# Patient Record
Sex: FEMALE | Race: BLACK OR AFRICAN AMERICAN | NOT HISPANIC OR LATINO
[De-identification: names, ages, dates, MRNs, and addresses within clinical notes are randomized per-mention and may not be internally consistent; named-entity substitution may affect disease eponyms.]

---

## 2020-07-23 PROBLEM — Z00.00 ENCOUNTER FOR PREVENTIVE HEALTH EXAMINATION: Status: ACTIVE | Noted: 2020-07-23

## 2020-08-03 ENCOUNTER — LABORATORY RESULT (OUTPATIENT)
Age: 45
End: 2020-08-03

## 2020-08-06 ENCOUNTER — APPOINTMENT (OUTPATIENT)
Dept: PULMONOLOGY | Facility: CLINIC | Age: 45
End: 2020-08-06
Payer: COMMERCIAL

## 2020-08-06 VITALS
TEMPERATURE: 98.2 F | OXYGEN SATURATION: 97 % | SYSTOLIC BLOOD PRESSURE: 100 MMHG | HEART RATE: 83 BPM | HEIGHT: 65 IN | BODY MASS INDEX: 27.99 KG/M2 | WEIGHT: 168 LBS | DIASTOLIC BLOOD PRESSURE: 83 MMHG

## 2020-08-06 PROCEDURE — 99244 OFF/OP CNSLTJ NEW/EST MOD 40: CPT | Mod: 25

## 2020-08-06 PROCEDURE — 94010 BREATHING CAPACITY TEST: CPT

## 2020-08-06 NOTE — PHYSICAL EXAM
[No Acute Distress] : no acute distress [Normal Appearance] : normal appearance [Normal S1, S2] : normal s1, s2 [Normal Rate/Rhythm] : normal rate/rhythm [No Resp Distress] : no resp distress [No Murmurs] : no murmurs [Clear to Auscultation Bilaterally] : clear to auscultation bilaterally [Benign] : benign [No Clubbing] : no clubbing [No Edema] : no edema [Normal Color/ Pigmentation] : normal color/ pigmentation [Normal Affect] : normal affect

## 2020-08-11 ENCOUNTER — LABORATORY RESULT (OUTPATIENT)
Age: 45
End: 2020-08-11

## 2020-08-13 ENCOUNTER — OUTPATIENT (OUTPATIENT)
Dept: OUTPATIENT SERVICES | Facility: HOSPITAL | Age: 45
LOS: 1 days | End: 2020-08-13
Payer: COMMERCIAL

## 2020-08-13 DIAGNOSIS — R06.00 DYSPNEA, UNSPECIFIED: ICD-10-CM

## 2020-08-13 PROCEDURE — 94010 BREATHING CAPACITY TEST: CPT | Mod: 26

## 2020-08-13 PROCEDURE — 94726 PLETHYSMOGRAPHY LUNG VOLUMES: CPT

## 2020-08-13 PROCEDURE — 94760 N-INVAS EAR/PLS OXIMETRY 1: CPT

## 2020-08-13 PROCEDURE — 94726 PLETHYSMOGRAPHY LUNG VOLUMES: CPT | Mod: 26

## 2020-08-13 PROCEDURE — 94729 DIFFUSING CAPACITY: CPT

## 2020-08-13 PROCEDURE — 94060 EVALUATION OF WHEEZING: CPT

## 2020-08-13 PROCEDURE — 95012 NITRIC OXIDE EXP GAS DETER: CPT

## 2020-08-13 PROCEDURE — 94729 DIFFUSING CAPACITY: CPT | Mod: 26

## 2020-08-21 ENCOUNTER — TRANSCRIPTION ENCOUNTER (OUTPATIENT)
Age: 45
End: 2020-08-21

## 2020-08-21 NOTE — ASSESSMENT
[FreeTextEntry1] : Data reviewed:\par \par CXR reportedly clear x 2 and not repeated today\par \par Luz 08/06/2020 : normal\par \par Impression:\par Chest tightness\par \par Plan:\par I doubt she has a lung disease to explain this symptom - asthma would be the likeliest pulmonary disorder and she's had no improvement on Symbicort and normal spirometry today despite having chest tightness at this time. Will hold Symbicort and do full PFT and FENO.\par --\par PFT [facility] 8/2020: normal luz, no BD response, normal volumes, DLCO 71% / FENO 14\par Reviewed w her 8/21.

## 2020-08-21 NOTE — HISTORY OF PRESENT ILLNESS
[TextBox_4] : 08/06/2020: Asked to evaluate patient by Dr Hugo King for dyspnea. Works in [employer], on March 12 was exposed to Covid and on March 17 started to have chest and back pain. This continued for about 5 weeks. No fever or other symptoms during that time. Then developed some sinus congestion. [place] [facility] Nasal stuffiness persisted and chest got tight and she went to [facility] ED about a month ago. 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 Put on Prevacid for reflux and Allegra, but symptoms worsened and she went back to [facility] ED. Covid Ab, CXR, bloods all negative. No cough or wheeze. Worse in heat. Gives her such a sense of pressure she has to urinate and have a bowel movement. Has never had asthma. Doesn't smoke. working from home during Covid, has a dog. Has lost weight during this illness, a little. Has had anxiety, went to ED for panic May 2019. The ENT also tried montelukast but she only tolerated x 2 days due to diarrhea. And an NP started Symbicort 2 weeks ago and she takes 2 puffs bid. This has made only a little difference. Last week has used a rescue inhaler x 2.\par

## 2020-08-21 NOTE — CONSULT LETTER
[Dear  ___] : Dear  [unfilled], [Please see my note below.] : Please see my note below. [( Thank you for referring [unfilled] for consultation for _____ )] : Thank you for referring [unfilled] for consultation for [unfilled] [Consult Closing:] : Thank you very much for allowing me to participate in the care of this patient.  If you have any questions, please do not hesitate to contact me. [Sincerely,] : Sincerely, [FreeTextEntry2] : Hugo King MD\par [address]\[place] [address] [FreeTextEntry3] : Tea Barajas MD, FCCP\par

## 2022-07-14 ENCOUNTER — TRANSCRIPTION ENCOUNTER (OUTPATIENT)
Age: 47
End: 2022-07-14

## 2022-07-15 ENCOUNTER — INPATIENT (INPATIENT)
Facility: HOSPITAL | Age: 47
LOS: 3 days | Discharge: HOME HEALTH | DRG: 494 | End: 2022-07-19
Attending: ORTHOPAEDIC SURGERY | Admitting: ORTHOPAEDIC SURGERY
Payer: COMMERCIAL

## 2022-07-15 VITALS
OXYGEN SATURATION: 100 % | HEIGHT: 65 IN | SYSTOLIC BLOOD PRESSURE: 122 MMHG | WEIGHT: 184.97 LBS | RESPIRATION RATE: 16 BRPM | TEMPERATURE: 98 F | DIASTOLIC BLOOD PRESSURE: 71 MMHG | HEART RATE: 75 BPM

## 2022-07-15 LAB
ANION GAP SERPL CALC-SCNC: 14 MMOL/L — SIGNIFICANT CHANGE UP (ref 5–17)
APTT BLD: 30.4 SEC — SIGNIFICANT CHANGE UP (ref 27.5–35.5)
BASOPHILS # BLD AUTO: 0.01 K/UL — SIGNIFICANT CHANGE UP (ref 0–0.2)
BASOPHILS NFR BLD AUTO: 0.2 % — SIGNIFICANT CHANGE UP (ref 0–2)
BLD GP AB SCN SERPL QL: NEGATIVE — SIGNIFICANT CHANGE UP
BUN SERPL-MCNC: 15 MG/DL — SIGNIFICANT CHANGE UP (ref 7–23)
CALCIUM SERPL-MCNC: 9.2 MG/DL — SIGNIFICANT CHANGE UP (ref 8.4–10.5)
CHLORIDE SERPL-SCNC: 109 MMOL/L — HIGH (ref 96–108)
CO2 SERPL-SCNC: 20 MMOL/L — LOW (ref 22–31)
CREAT SERPL-MCNC: 0.87 MG/DL — SIGNIFICANT CHANGE UP (ref 0.5–1.3)
EGFR: 82 ML/MIN/1.73M2 — SIGNIFICANT CHANGE UP
EOSINOPHIL # BLD AUTO: 0.09 K/UL — SIGNIFICANT CHANGE UP (ref 0–0.5)
EOSINOPHIL NFR BLD AUTO: 1.4 % — SIGNIFICANT CHANGE UP (ref 0–6)
GLUCOSE SERPL-MCNC: 94 MG/DL — SIGNIFICANT CHANGE UP (ref 70–99)
HCG SERPL-ACNC: <0 MIU/ML — SIGNIFICANT CHANGE UP
HCT VFR BLD CALC: 35.1 % — SIGNIFICANT CHANGE UP (ref 34.5–45)
HGB BLD-MCNC: 11.3 G/DL — LOW (ref 11.5–15.5)
IMM GRANULOCYTES NFR BLD AUTO: 0.3 % — SIGNIFICANT CHANGE UP (ref 0–1.5)
INR BLD: 1.01 — SIGNIFICANT CHANGE UP (ref 0.88–1.16)
LYMPHOCYTES # BLD AUTO: 2.39 K/UL — SIGNIFICANT CHANGE UP (ref 1–3.3)
LYMPHOCYTES # BLD AUTO: 36.8 % — SIGNIFICANT CHANGE UP (ref 13–44)
MCHC RBC-ENTMCNC: 28.6 PG — SIGNIFICANT CHANGE UP (ref 27–34)
MCHC RBC-ENTMCNC: 32.2 GM/DL — SIGNIFICANT CHANGE UP (ref 32–36)
MCV RBC AUTO: 88.9 FL — SIGNIFICANT CHANGE UP (ref 80–100)
MONOCYTES # BLD AUTO: 0.59 K/UL — SIGNIFICANT CHANGE UP (ref 0–0.9)
MONOCYTES NFR BLD AUTO: 9.1 % — SIGNIFICANT CHANGE UP (ref 2–14)
NEUTROPHILS # BLD AUTO: 3.4 K/UL — SIGNIFICANT CHANGE UP (ref 1.8–7.4)
NEUTROPHILS NFR BLD AUTO: 52.2 % — SIGNIFICANT CHANGE UP (ref 43–77)
NRBC # BLD: 0 /100 WBCS — SIGNIFICANT CHANGE UP (ref 0–0)
PLATELET # BLD AUTO: 200 K/UL — SIGNIFICANT CHANGE UP (ref 150–400)
POTASSIUM SERPL-MCNC: 4 MMOL/L — SIGNIFICANT CHANGE UP (ref 3.5–5.3)
POTASSIUM SERPL-SCNC: 4 MMOL/L — SIGNIFICANT CHANGE UP (ref 3.5–5.3)
PROTHROM AB SERPL-ACNC: 12 SEC — SIGNIFICANT CHANGE UP (ref 10.5–13.4)
RBC # BLD: 3.95 M/UL — SIGNIFICANT CHANGE UP (ref 3.8–5.2)
RBC # FLD: 13.7 % — SIGNIFICANT CHANGE UP (ref 10.3–14.5)
RH IG SCN BLD-IMP: POSITIVE — SIGNIFICANT CHANGE UP
SARS-COV-2 RNA SPEC QL NAA+PROBE: NEGATIVE — SIGNIFICANT CHANGE UP
SODIUM SERPL-SCNC: 143 MMOL/L — SIGNIFICANT CHANGE UP (ref 135–145)
WBC # BLD: 6.5 K/UL — SIGNIFICANT CHANGE UP (ref 3.8–10.5)
WBC # FLD AUTO: 6.5 K/UL — SIGNIFICANT CHANGE UP (ref 3.8–10.5)

## 2022-07-15 PROCEDURE — 99221 1ST HOSP IP/OBS SF/LOW 40: CPT

## 2022-07-15 PROCEDURE — 73564 X-RAY EXAM KNEE 4 OR MORE: CPT | Mod: 26,LT

## 2022-07-15 PROCEDURE — 73610 X-RAY EXAM OF ANKLE: CPT | Mod: 26,LT,76

## 2022-07-15 PROCEDURE — 99285 EMERGENCY DEPT VISIT HI MDM: CPT

## 2022-07-15 PROCEDURE — 73700 CT LOWER EXTREMITY W/O DYE: CPT | Mod: 26,LT,MG

## 2022-07-15 PROCEDURE — 73590 X-RAY EXAM OF LOWER LEG: CPT | Mod: 26,LT

## 2022-07-15 PROCEDURE — G1004: CPT

## 2022-07-15 PROCEDURE — 93010 ELECTROCARDIOGRAM REPORT: CPT

## 2022-07-15 PROCEDURE — 71046 X-RAY EXAM CHEST 2 VIEWS: CPT | Mod: 26

## 2022-07-15 RX ORDER — HYDROMORPHONE HYDROCHLORIDE 2 MG/ML
0.5 INJECTION INTRAMUSCULAR; INTRAVENOUS; SUBCUTANEOUS ONCE
Refills: 0 | Status: DISCONTINUED | OUTPATIENT
Start: 2022-07-15 | End: 2022-07-15

## 2022-07-15 RX ORDER — MORPHINE SULFATE 50 MG/1
4 CAPSULE, EXTENDED RELEASE ORAL ONCE
Refills: 0 | Status: DISCONTINUED | OUTPATIENT
Start: 2022-07-15 | End: 2022-07-15

## 2022-07-15 RX ADMIN — HYDROMORPHONE HYDROCHLORIDE 0.5 MILLIGRAM(S): 2 INJECTION INTRAMUSCULAR; INTRAVENOUS; SUBCUTANEOUS at 18:13

## 2022-07-15 RX ADMIN — MORPHINE SULFATE 4 MILLIGRAM(S): 50 CAPSULE, EXTENDED RELEASE ORAL at 17:10

## 2022-07-15 NOTE — ED PROVIDER NOTE - PHYSICAL EXAMINATION
Vitals reviewed  Gen: + acute painful distress, speaking in full sentences  Skin: wwp, no rash/lesions, no skin breaks  HEENT: ncat, eomi, mmm  CV: rrr, no audible m/r/g  Resp: symmetrical expansion, ctab, no w/r/r  LLE: + deformity w/ tenting skin medial malleolus, able to move toes/knee, cap refill < 2 sec, 2+ DP/PT pulses, no calf ttp/edema, compartments soft   Neuro: alert/oriented, no focal deficits

## 2022-07-15 NOTE — H&P ADULT - HISTORY OF PRESENT ILLNESS
Orthopaedic Surgery Consult Note    Attending Physician: Ronit  Consult requested by: ED    CC: L ankle pain    HPI:  46 F denies pmh p/w L ankle pain s/p injury.  Pt [place] e[place] but denies head trauma or loc.  reports ankle was popped back into place by medical staff in [facility] then splinted by EMS.  did not take anything for pain.  denies dizziness, fainting, neck/back pain, numbness/weakness, paresthesia, skin breaks, other injuries, use of AC.

## 2022-07-15 NOTE — H&P ADULT - NSHPLABSRESULTS_GEN_ALL_CORE
Vital Signs Last 24 Hrs  T(C): 36.5 (15 Jul 2022 16:24), Max: 36.5 (15 Jul 2022 16:24)  T(F): 97.7 (15 Jul 2022 16:24), Max: 97.7 (15 Jul 2022 16:24)  HR: 70 (15 Jul 2022 21:35) (70 - 75)  BP: 127/85 (15 Jul 2022 21:35) (122/71 - 127/85)  BP(mean): --  RR: 18 (15 Jul 2022 21:35) (16 - 18)  SpO2: 98% (15 Jul 2022 21:35) (98% - 100%)    Parameters below as of 15 Jul 2022 16:24  Patient On (Oxygen Delivery Method): room air          Labs:                        11.3   6.50  )-----------( 200      ( 15 Jul 2022 19:00 )             35.1     07-15    143  |  109<H>  |  15  ----------------------------<  94  4.0   |  20<L>  |  0.87    Ca    9.2      15 Jul 2022 19:00      PT/INR - ( 15 Jul 2022 19:00 )   PT: 12.0 sec;   INR: 1.01          PTT - ( 15 Jul 2022 19:00 )  PTT:30.4 sec    Imaging:   XRay L ankle (AP/Lateral/ Mortise): Trimalleolar fracture

## 2022-07-15 NOTE — H&P ADULT - NSHPPHYSICALEXAM_GEN_ALL_CORE
Physical Exam:  General: Pt Alert and oriented, NAD  L ankle with obvious deformity and medial skin tenting, no poke holes.  Pulses: 2+ dp, pt pulses, wwp, cap refill <3 seconds  Sensation: SILT sural/saph/sp/dp/ tibial distributions  Motor: EHL/FHL firing

## 2022-07-15 NOTE — CONSULT NOTE ADULT - ASSESSMENT
Ms. Bermudez is a 46 year old female with HLD presenting s/p fracture of the left ankle pending OR repair tomorrow AM with ortho.     #Perioperative evaluation   EKG PENDING   - pt with no active cardiac conditions, including unstable coronary syndrome, decompensated CHF, significant arrhythmias, or severe valvular disease  - RCRI score of 0, which equates to a 3.9% 30-day risk of death, MI, or cardiac arrest  - Pt pending an intermediate-risk procedure with an RCRI score of 0. Given that pt has good functional capacity with METs >=4 (can climb 2 flights of stairs that lead to his residence without any CP or SOB), pt does not need further cardiac testing prior to OR.  - Intermediate risk patient for intermediate risk procedure     #HLD  - rec c/w home statin perioperatively     #Anemia   Likely BETH 2/2 menstrual blood loss. Denies any blood in the stool.  - rec iron studies and outpatient f/u   - rec colonoscopy given age     Ms. Bermudez is a 46 year old female with HLD presenting s/p fracture of the left ankle pending OR repair tomorrow AM with ortho.     #Perioperative evaluation   EKG PENDING   - pt with no active cardiac conditions, including unstable coronary syndrome, decompensated CHF, significant arrhythmias, or severe valvular disease  - RCRI score of 0, which equates to a 3.9% 30-day risk of death, MI, or cardiac arrest  - Pt pending an intermediate-risk procedure with an RCRI score of 0. Given that pt has good functional capacity with METs >=4 (can climb 2 flights of stairs that lead to his residence without any CP or SOB), pt does not need further cardiac testing prior to OR.  - TRIVEDI score of zero   - Intermediate risk patient for intermediate risk procedure     #HLD  - rec c/w home statin perioperatively     #Anemia   Likely BETH 2/2 menstrual blood loss. Denies any blood in the stool.  - rec iron studies and outpatient f/u   - rec colonoscopy given age     Ms. Bermudez is a 46 year old female with HLD presenting s/p fracture of the left ankle pending OR repair tomorrow AM with ortho.     #Perioperative evaluation   EKG PENDING   - pt with no active cardiac conditions, including unstable coronary syndrome, decompensated CHF, significant arrhythmias, or severe valvular disease  - RCRI score of 0, which equates to a 3.9% 30-day risk of death, MI, or cardiac arrest  - Pt pending an intermediate-risk procedure with an RCRI score of 0. Given that pt has good functional capacity with METs >=4 (can climb 2 flights of stairs that lead to his residence without any CP or SOB), pt does not need further cardiac testing prior to OR.  - TRIVEDI score of zero   - Intermediate risk patient for intermediate risk procedure     #HLD  - rec c/w home statin perioperatively     #Genital herpes   - patient recently contracted genital herpes   - c/w valacyclovir 1mg daily     #Anemia   Likely BETH 2/2 menstrual blood loss. Denies any blood in the stool.  - rec iron studies and outpatient f/u   - rec colonoscopy given age     Ms. Bermudez is a 46 year old female with HLD presenting s/p fracture of the left ankle pending OR repair tomorrow AM with ortho.     #Perioperative evaluation   EKG NSR with TWI in V1-V2 (can be a normal physiologic finding) and inverted T wave in V3 less than 1mm in size.   - pt with no active cardiac conditions, including unstable coronary syndrome, decompensated CHF, significant arrhythmias, or severe valvular disease  - RCRI score of 0, which equates to a 3.9% 30-day risk of death, MI, or cardiac arrest  - Pt pending an intermediate-risk procedure with an RCRI score of 0. Given that pt has good functional capacity with METs >=4 (can climb 2 flights of stairs that lead to his residence without any CP or SOB), pt does not need further cardiac testing prior to OR.   - TRIVEDI score of zero   - Intermediate risk patient for intermediate risk procedure     #HLD  - rec c/w home statin perioperatively     #Genital herpes   - patient recently contracted genital herpes   - c/w valacyclovir 1mg daily     #Anemia   Likely BETH 2/2 menstrual blood loss. Denies any blood in the stool.  - rec iron studies and outpatient f/u   - rec colonoscopy given age

## 2022-07-15 NOTE — CONSULT NOTE ADULT - SUBJECTIVE AND OBJECTIVE BOX
KRISH BERMUDEZ  46y  Female      Patient is a 46y old  Female who presents with a chief complaint of Left ankle pain found to have fracture.     46 F denies pmh p/w L ankle pain s/p injury.  Pt reports eversion injury to L ankle while roller blading in [facility], fell but denies head trauma or loc.  reports ankle was popped back into place by medical staff in [facility] then splinted by EMS.  did not take anything for pain.  denies dizziness, fainting, neck/back pain, numbness/weakness, paresthesia, skin breaks, other injuries, use of AC. She reports that she recently contracted herpes and now takes valacyclovir. Denies any adverse reactions to anesthesia in her family. COVID vaccinated x3, has had COVID twice.         REVIEW OF SYSTEMS:  CONSTITUTIONAL: No fever, weight loss, or fatigue  EYES: No eye pain, visual disturbances, or discharge  ENMT:  No difficulty hearing, tinnitus, vertigo; No sinus or throat pain  NECK: No pain or stiffness  BREASTS: No pain, masses, or nipple discharge  RESPIRATORY: No cough, wheezing, chills or hemoptysis; No shortness of breath (though she has occasional cough which she contributes to COVID)   CARDIOVASCULAR: No chest pain, palpitations, dizziness, or leg swelling  GASTROINTESTINAL: No abdominal or epigastric pain. No nausea, vomiting, or hematemesis; No diarrhea or constipation. No melena or hematochezia.  GENITOURINARY: No dysuria, frequency, hematuria, or incontinence  NEUROLOGICAL: No headaches, memory loss, loss of strength, numbness, or tremors  SKIN: No itching, burning, rashes, or lesions   LYMPH NODES: No enlarged glands  ENDOCRINE: No heat or cold intolerance; No hair loss  MUSCULOSKELETAL: pain in left ankle, otherwise MSK exam is benign   PSYCHIATRIC: No depression, anxiety, mood swings, or difficulty sleeping  HEME/LYMPH: No easy bruising, or bleeding gums  ALLERY AND IMMUNOLOGIC: No hives or eczema    T(C): 36.5 (07-15-22 @ 16:24), Max: 36.5 (07-15-22 @ 16:24)  HR: 70 (07-15-22 @ 21:35) (70 - 75)  BP: 127/85 (07-15-22 @ 21:35) (122/71 - 127/85)  RR: 18 (07-15-22 @ 21:35) (16 - 18)  SpO2: 98% (07-15-22 @ 21:35) (98% - 100%)  Wt(kg): --Vital Signs Last 24 Hrs  T(C): 36.5 (15 Jul 2022 16:24), Max: 36.5 (15 Jul 2022 16:24)  T(F): 97.7 (15 Jul 2022 16:24), Max: 97.7 (15 Jul 2022 16:24)  HR: 70 (15 Jul 2022 21:35) (70 - 75)  BP: 127/85 (15 Jul 2022 21:35) (122/71 - 127/85)  BP(mean): --  RR: 18 (15 Jul 2022 21:35) (16 - 18)  SpO2: 98% (15 Jul 2022 21:35) (98% - 100%)    Parameters below as of 15 Jul 2022 16:24  Patient On (Oxygen Delivery Method): room air        PHYSICAL EXAM:  GENERAL: NAD, well-groomed  HEAD:  Atraumatic, Normocephalic  EYES: EOMI, PERRLA, conjunctiva and sclera clear  ENMT: No tonsillar erythema, exudates, or enlargement; Moist mucous membranes, Good dentition, No lesions  NECK: Supple, No JVD, Normal thyroid  NERVOUS SYSTEM:  Alert & Oriented X3, Good concentration  CHEST/LUNG: Clear to percussion bilaterally; No rales, rhonchi, wheezing, or rubs  HEART: Regular rate and rhythm; 1/6 GEMINI RUSB   ABDOMEN: Soft, Nontender, Nondistended; Bowel sounds present  EXTREMITIES:  2+ Peripheral Pulses, No clubbing, cyanosis, or edema  LYMPH: No lymphadenopathy noted  SKIN: No rashes or lesions     Consultant(s) Notes Reviewed:  [x ] YES  [ ] NO  Care Discussed with Consultants/Other Providers [ x] YES  [ ] NO    LABS:  CBC   07-15-22 @ 19:00  Hematcorit 35.1  Hemoglobin 11.3  Mean Cell Hemoglobin 28.6  Platelet Count-Automated 200  RBC Count 3.95  Red Cell Distrib Width 13.7  Wbc Count 6.50      BMP  07-15-22 @ 19:00  Anion Gap. Serum 14  Blood Urea Nitrogen,Serm 15  Calcium, Total Serum 9.2  Carbon Dioxide, Serum 20  Chloride, Serum 109  Creatinine, Serum 0.87  eGFR in  --  eGFR in Non Afican American --  Gloucose, serum 94  Potassium, Serum 4.0  Sodium, Serum 143                  CMP  07-15-22 @ 19:00  Swapna Aminotransferase(ALT/SGPT)--  Albumin, Serum --  Alkaline Phosphatase, Serum --  Anion Gap, Serum 14  Aspartate Aminotransferase (AST/SGOT)--  Bilirubin Total, Serum --  Blood Urea Nitrogen, Serum 15  Calcium,Total Serum 9.2  Carbon Dioxide, Serum 20  Chloride, Serum 109  Creatinine, Serum 0.87  eGFR if  --  eGFR if Non African American --  Glucose, Serum 94  Potassium, Serum 4.0  Protein Total, Serum --  Sodium, Serum 143                          PT/INR  PT/INR  07-15-22 @ 19:00  INR 1.01  Prothrombin Time Comment --  Prothrobin Time, [id number]      Amylase/Lipase            RADIOLOGY & ADDITIONAL TESTS:    Imaging Personally Reviewed:  [ ] YES  [ ] NO

## 2022-07-15 NOTE — ED PROVIDER NOTE - CLINICAL SUMMARY MEDICAL DECISION MAKING FREE TEXT BOX
46 F denies pmh p/w L ankle pain s/p injury.  on exam pt in acute painful distress, LLE: + deformity w/ tenting skin medial malleolus, able to move toes/knee, cap refill < 2 sec, 2+ DP/PT pulses, no calf ttp/edema, compartments soft.  clinically dislocated but NVI.  will obtain xray, give morphine and c/s ortho

## 2022-07-15 NOTE — ED ADULT NURSE NOTE - OBJECTIVE STATEMENT
Pt presented to ED with c/o of possible left ankle fracture due to fall she sustained aprox 2 h ago. AOX4. VSS.  Patient denies chest pain, discomfort, shortness of breath, difficulty breathing and any form of distress not noted. Currently having menstruation. Patient oriented to ED area. All needs attended. Rounding in progress. Fall risk precautions maintained. Pt presented to ED with c/o of possible left ankle fracture/dislocation due to fall & twist she sustained aprox 2 h ago. AOX4. VSS.  Patient denies LOC changes, chest pain, discomfort, shortness of breath, difficulty breathing and any form of distress not noted. Currently having menstruation. Patient oriented to ED area. All needs attended. Rounding in progress. Fall risk precautions maintained.

## 2022-07-15 NOTE — ED PROVIDER NOTE - OBJECTIVE STATEMENT
46 F denies pmh p/w L ankle pain s/p injury.  Pt reports eversion injury to L ankle while roller blading in [facility], fell but denies head trauma or loc.  reports ankle was popped back into place by medical staff in [facility] then splinted by EMS.  did not take anything for pain.  denies dizziness, fainting, neck/back pain, numbness/weakness, paresthesia, skin breaks, other injuries, use of AC.

## 2022-07-15 NOTE — ED ADULT TRIAGE NOTE - OTHER COMPLAINTS
left ankle pain s/p twisting ankle roller blading. per ems no obvious deformity, [place].

## 2022-07-15 NOTE — H&P ADULT - ASSESSMENT
A/P: 46yFemale with trimalleolar fracture, pending OR for L ankle ORIF 7/16  - admit  - pain control  - preop labs, CXR, EKG  - 2U on hold for OR  - NPO after midnight  - WBS: NWB LLE in AO splint, strict elevation  - Dispo: pending OR  - Discussed with Attending, Dr. Ronit Loco, PGY-2  Ortho Pager [phone]

## 2022-07-16 ENCOUNTER — TRANSCRIPTION ENCOUNTER (OUTPATIENT)
Age: 47
End: 2022-07-16

## 2022-07-16 DIAGNOSIS — S82.899A OTHER FRACTURE OF UNSPECIFIED LOWER LEG, INITIAL ENCOUNTER FOR CLOSED FRACTURE: ICD-10-CM

## 2022-07-16 LAB
ANION GAP SERPL CALC-SCNC: 11 MMOL/L — SIGNIFICANT CHANGE UP (ref 5–17)
BUN SERPL-MCNC: 11 MG/DL — SIGNIFICANT CHANGE UP (ref 7–23)
CALCIUM SERPL-MCNC: 9 MG/DL — SIGNIFICANT CHANGE UP (ref 8.4–10.5)
CHLORIDE SERPL-SCNC: 107 MMOL/L — SIGNIFICANT CHANGE UP (ref 96–108)
CO2 SERPL-SCNC: 24 MMOL/L — SIGNIFICANT CHANGE UP (ref 22–31)
CREAT SERPL-MCNC: 0.77 MG/DL — SIGNIFICANT CHANGE UP (ref 0.5–1.3)
EGFR: 95 ML/MIN/1.73M2 — SIGNIFICANT CHANGE UP
GLUCOSE SERPL-MCNC: 103 MG/DL — HIGH (ref 70–99)
HCG UR QL: NEGATIVE — SIGNIFICANT CHANGE UP
HCT VFR BLD CALC: 35.2 % — SIGNIFICANT CHANGE UP (ref 34.5–45)
HGB BLD-MCNC: 11.1 G/DL — LOW (ref 11.5–15.5)
MCHC RBC-ENTMCNC: 28.4 PG — SIGNIFICANT CHANGE UP (ref 27–34)
MCHC RBC-ENTMCNC: 31.5 GM/DL — LOW (ref 32–36)
MCV RBC AUTO: 90 FL — SIGNIFICANT CHANGE UP (ref 80–100)
NRBC # BLD: 0 /100 WBCS — SIGNIFICANT CHANGE UP (ref 0–0)
PLATELET # BLD AUTO: 195 K/UL — SIGNIFICANT CHANGE UP (ref 150–400)
POTASSIUM SERPL-MCNC: 3.9 MMOL/L — SIGNIFICANT CHANGE UP (ref 3.5–5.3)
POTASSIUM SERPL-SCNC: 3.9 MMOL/L — SIGNIFICANT CHANGE UP (ref 3.5–5.3)
RBC # BLD: 3.91 M/UL — SIGNIFICANT CHANGE UP (ref 3.8–5.2)
RBC # FLD: 13.9 % — SIGNIFICANT CHANGE UP (ref 10.3–14.5)
SODIUM SERPL-SCNC: 142 MMOL/L — SIGNIFICANT CHANGE UP (ref 135–145)
WBC # BLD: 6.67 K/UL — SIGNIFICANT CHANGE UP (ref 3.8–10.5)
WBC # FLD AUTO: 6.67 K/UL — SIGNIFICANT CHANGE UP (ref 3.8–10.5)

## 2022-07-16 DEVICE — SCREW KREULOCK VAR TI 3X12MM: Type: IMPLANTABLE DEVICE | Status: FUNCTIONAL

## 2022-07-16 DEVICE — IMPLANTABLE DEVICE: Type: IMPLANTABLE DEVICE | Status: FUNCTIONAL

## 2022-07-16 DEVICE — SCREW CORT LPS 3.5X18MM: Type: IMPLANTABLE DEVICE | Status: FUNCTIONAL

## 2022-07-16 DEVICE — SCREW CORT 3.5X16MM: Type: IMPLANTABLE DEVICE | Status: FUNCTIONAL

## 2022-07-16 DEVICE — SCREW KREULOCK VAR TI 3X16MM: Type: IMPLANTABLE DEVICE | Status: FUNCTIONAL

## 2022-07-16 DEVICE — SCREW CORT LPS 3.5X12MM: Type: IMPLANTABLE DEVICE | Status: FUNCTIONAL

## 2022-07-16 DEVICE — SCREW KREULOCK VAR TI 3X14MM: Type: IMPLANTABLE DEVICE | Status: FUNCTIONAL

## 2022-07-16 DEVICE — SCREW CORT 3.5X14MM: Type: IMPLANTABLE DEVICE | Status: FUNCTIONAL

## 2022-07-16 RX ORDER — ONDANSETRON 8 MG/1
4 TABLET, FILM COATED ORAL ONCE
Refills: 0 | Status: COMPLETED | OUTPATIENT
Start: 2022-07-16 | End: 2022-07-16

## 2022-07-16 RX ORDER — ASPIRIN/CALCIUM CARB/MAGNESIUM 324 MG
325 TABLET ORAL DAILY
Refills: 0 | Status: DISCONTINUED | OUTPATIENT
Start: 2022-07-17 | End: 2022-07-19

## 2022-07-16 RX ORDER — POVIDONE-IODINE 5 %
1 AEROSOL (ML) TOPICAL ONCE
Refills: 0 | Status: COMPLETED | OUTPATIENT
Start: 2022-07-16 | End: 2022-07-16

## 2022-07-16 RX ORDER — OXYCODONE HYDROCHLORIDE 5 MG/1
5 TABLET ORAL EVERY 4 HOURS
Refills: 0 | Status: DISCONTINUED | OUTPATIENT
Start: 2022-07-16 | End: 2022-07-18

## 2022-07-16 RX ORDER — CHLORHEXIDINE GLUCONATE 213 G/1000ML
1 SOLUTION TOPICAL EVERY 12 HOURS
Refills: 0 | Status: DISCONTINUED | OUTPATIENT
Start: 2022-07-16 | End: 2022-07-16

## 2022-07-16 RX ORDER — ASPIRIN/CALCIUM CARB/MAGNESIUM 324 MG
1 TABLET ORAL
Qty: 30 | Refills: 0
Start: 2022-07-16 | End: 2022-08-14

## 2022-07-16 RX ORDER — HYDROMORPHONE HYDROCHLORIDE 2 MG/ML
0.5 INJECTION INTRAMUSCULAR; INTRAVENOUS; SUBCUTANEOUS EVERY 4 HOURS
Refills: 0 | Status: DISCONTINUED | OUTPATIENT
Start: 2022-07-16 | End: 2022-07-19

## 2022-07-16 RX ORDER — OXYCODONE HYDROCHLORIDE 5 MG/1
10 TABLET ORAL EVERY 4 HOURS
Refills: 0 | Status: DISCONTINUED | OUTPATIENT
Start: 2022-07-16 | End: 2022-07-18

## 2022-07-16 RX ORDER — HYDROMORPHONE HYDROCHLORIDE 2 MG/ML
0.5 INJECTION INTRAMUSCULAR; INTRAVENOUS; SUBCUTANEOUS
Refills: 0 | Status: DISCONTINUED | OUTPATIENT
Start: 2022-07-16 | End: 2022-07-19

## 2022-07-16 RX ORDER — ONDANSETRON 8 MG/1
1 TABLET, FILM COATED ORAL
Qty: 0 | Refills: 0 | DISCHARGE
Start: 2022-07-16

## 2022-07-16 RX ORDER — CEFAZOLIN SODIUM 1 G
2000 VIAL (EA) INJECTION EVERY 8 HOURS
Refills: 0 | Status: COMPLETED | OUTPATIENT
Start: 2022-07-16 | End: 2022-07-17

## 2022-07-16 RX ORDER — SODIUM CHLORIDE 9 MG/ML
1000 INJECTION INTRAMUSCULAR; INTRAVENOUS; SUBCUTANEOUS
Refills: 0 | Status: DISCONTINUED | OUTPATIENT
Start: 2022-07-16 | End: 2022-07-19

## 2022-07-16 RX ORDER — ONDANSETRON 8 MG/1
4 TABLET, FILM COATED ORAL EVERY 8 HOURS
Refills: 0 | Status: DISCONTINUED | OUTPATIENT
Start: 2022-07-16 | End: 2022-07-19

## 2022-07-16 RX ADMIN — Medication 100 MILLIGRAM(S): at 17:05

## 2022-07-16 RX ADMIN — ONDANSETRON 4 MILLIGRAM(S): 8 TABLET, FILM COATED ORAL at 16:27

## 2022-07-16 RX ADMIN — ONDANSETRON 4 MILLIGRAM(S): 8 TABLET, FILM COATED ORAL at 13:31

## 2022-07-16 RX ADMIN — OXYCODONE HYDROCHLORIDE 10 MILLIGRAM(S): 5 TABLET ORAL at 23:15

## 2022-07-16 RX ADMIN — Medication 1 APPLICATION(S): at 07:09

## 2022-07-16 RX ADMIN — HYDROMORPHONE HYDROCHLORIDE 0.5 MILLIGRAM(S): 2 INJECTION INTRAMUSCULAR; INTRAVENOUS; SUBCUTANEOUS at 13:47

## 2022-07-16 NOTE — DISCHARGE NOTE PROVIDER - HOSPITAL COURSE
Admitted 7/15  L trimalleolar ankle fracture ORIF 7/16  Donna-op Antibiotics  Pain control  DVT prophylaxis  OOB/Physical Therapy Admitted 7/15  Surgery: L trimalleolar ankle fracture ORIF 7/16  Donna-op Antibiotics: Ancef  Pain control  DVT prophylaxis: ASAmg daily  OOB/Physical Therapy   Medicine consulted for co management  Inpatient events:  7/18: one episode chest pain, resolved. Troponin WNL, EKG WNL

## 2022-07-16 NOTE — PRE-OP CHECKLIST - SELECT TESTS ORDERED
CBC/CMP/COVID-19 CBC/CMP/PT/PTT/INR/Hepatic Function/Type and Cross/Type and Screen/Urinalysis/HCG/UCG/EKG/CXR/POCT Blood Glucose/COVID-19

## 2022-07-16 NOTE — PATIENT PROFILE ADULT - FUNCTIONAL ASSESSMENT - BASIC MOBILITY 6.
2-calculated by average/Not able to assess (calculate score using [facility] averaging method)

## 2022-07-16 NOTE — DISCHARGE NOTE PROVIDER - NSDCMRMEDTOKEN_GEN_ALL_CORE_FT
Adult Aspirin 325 mg oral tablet: 1 tab(s) orally once a day   oxycodone-acetaminophen 5 mg-325 mg oral tablet: 1 tab(s) orally every 4 to 6 hours MDD:4 tabs   Adult Aspirin 325 mg oral tablet: 1 tab(s) orally once a day   ondansetron 4 mg oral tablet: 1 tab(s) orally every 8 hours  oxycodone-acetaminophen 5 mg-325 mg oral tablet: 1 tab(s) orally every 4 to 6 hours MDD:4 tabs   Adult Aspirin 325 mg oral tablet: 1 tab(s) orally once a day   ondansetron 4 mg oral tablet: 1 tab(s) orally every 8 hours  traMADol 50 mg oral tablet: 0.5-1  tab(s) orally every 4-6  hours, As needed, Moderate to Severe Pain MDD:4

## 2022-07-16 NOTE — PATIENT PROFILE ADULT - FALL HARM RISK - HARM RISK INTERVENTIONS
Assistance with ambulation/Assistance OOB with selected safe patient handling equipment/Communicate Risk of Fall with Harm to all staff/Discuss with provider need for PT consult/Monitor gait and stability/Provide patient with walking aids - walker, cane, crutches/Reinforce activity limits and safety measures with patient and family/Review medications for side effects contributing to fall risk/Sit up slowly, dangle for a short time, stand at bedside before walking/Tailored Fall Risk Interventions/Toileting schedule using arm’s reach rule for commode and bathroom/Use of alarms - bed, chair and/or voice tab/Visual Cue: Yellow wristband and red socks/Bed in lowest position, wheels locked, appropriate side rails in place/Call bell, personal items and telephone in reach/Instruct patient to call for assistance before getting out of bed or chair/Non-slip footwear when patient is out of bed/[place] to call system/Physically safe environment - no spills, clutter or unnecessary equipment/Purposeful Proactive Rounding/Room/bathroom lighting operational, light cord in reach

## 2022-07-16 NOTE — DISCHARGE NOTE PROVIDER - NSDCFUADDINST_GEN_ALL_CORE_FT
Follow up with Dr. Cottrell in 1-2 weeks. Call the office at [phone] to schedule your appointment.    NEW MEDICATIONS:  Percocet 5mg-325mg - 1 tab every 4-6 hours as needed for Moderate-Severe Pain  Aspirin 325mg daily x 30 days for DVT prophylaxis    WOUND CARE: You may shower. However, you cannot get your splint wet.    ACTIVITY: Non-weightbearing to LLE, use crutches. But no heavy lifting (>10lbs) or strenuous exercise. You may resume regular diet.    Call the office if you experience increasing pain, swelling or drainage from incision sites/wounds, or temperature >101.4F.  Follow up with Dr. Cottrell in 1-2 weeks. Call the office at [phone] to schedule your appointment.    NEW MEDICATIONS:  Percocet 5mg-325mg - 1 tab every 4-6 hours as needed for Moderate-Severe Pain  Aspirin 325mg daily x 30 days for DVT prophylaxis    WOUND CARE: You may shower. However, you cannot get your splint wet.    ACTIVITY: Left leg must be non-weightbearing. You cannot put any weight whatsoever on your left leg and must use crutches. No heavy lifting (>10lbs) or strenuous exercise. You may resume regular diet.    Call the office if you experience increasing pain or temperature >101.4F.  Follow up with Dr. Cottrell in 1-2 weeks. Call the office at [phone] to schedule your appointment.    NEW MEDICATIONS:  Percocet 5mg-325mg - 1 tab every 4-6 hours as needed for Moderate-Severe Pain  Aspirin 325mg daily x 30 days for DVT prophylaxis. Take this medication as prescribed.     WOUND CARE: You may shower. However, you cannot get your splint wet.    ACTIVITY: Left leg must be non-weightbearing. You cannot put any weight whatsoever on your left leg and must use crutches. No heavy lifting (>10lbs) or strenuous exercise. You may resume regular diet.    Call the office if you experience increasing pain or temperature >101.4F, chest pain, shortness of breath, nausea/vomiting, chills, etc.

## 2022-07-16 NOTE — DISCHARGE NOTE PROVIDER - CARE PROVIDER_API CALL
Vaibhav Cottrell)  Orthopaedic Surgery Surgery  [address] [place] [address]  Phone: [phone]  Fax: [phone]  Follow Up Time:

## 2022-07-17 LAB
ANION GAP SERPL CALC-SCNC: 10 MMOL/L — SIGNIFICANT CHANGE UP (ref 5–17)
BUN SERPL-MCNC: 8 MG/DL — SIGNIFICANT CHANGE UP (ref 7–23)
CALCIUM SERPL-MCNC: 8.6 MG/DL — SIGNIFICANT CHANGE UP (ref 8.4–10.5)
CHLORIDE SERPL-SCNC: 108 MMOL/L — SIGNIFICANT CHANGE UP (ref 96–108)
CO2 SERPL-SCNC: 25 MMOL/L — SIGNIFICANT CHANGE UP (ref 22–31)
CREAT SERPL-MCNC: 0.86 MG/DL — SIGNIFICANT CHANGE UP (ref 0.5–1.3)
EGFR: 83 ML/MIN/1.73M2 — SIGNIFICANT CHANGE UP
GLUCOSE SERPL-MCNC: 110 MG/DL — HIGH (ref 70–99)
HCT VFR BLD CALC: 33.6 % — LOW (ref 34.5–45)
HGB BLD-MCNC: 10.5 G/DL — LOW (ref 11.5–15.5)
MCHC RBC-ENTMCNC: 28.3 PG — SIGNIFICANT CHANGE UP (ref 27–34)
MCHC RBC-ENTMCNC: 31.3 GM/DL — LOW (ref 32–36)
MCV RBC AUTO: 90.6 FL — SIGNIFICANT CHANGE UP (ref 80–100)
NRBC # BLD: 0 /100 WBCS — SIGNIFICANT CHANGE UP (ref 0–0)
PLATELET # BLD AUTO: 183 K/UL — SIGNIFICANT CHANGE UP (ref 150–400)
POTASSIUM SERPL-MCNC: 3.6 MMOL/L — SIGNIFICANT CHANGE UP (ref 3.5–5.3)
POTASSIUM SERPL-SCNC: 3.6 MMOL/L — SIGNIFICANT CHANGE UP (ref 3.5–5.3)
RBC # BLD: 3.71 M/UL — LOW (ref 3.8–5.2)
RBC # FLD: 14.1 % — SIGNIFICANT CHANGE UP (ref 10.3–14.5)
SODIUM SERPL-SCNC: 143 MMOL/L — SIGNIFICANT CHANGE UP (ref 135–145)
WBC # BLD: 7.21 K/UL — SIGNIFICANT CHANGE UP (ref 3.8–10.5)
WBC # FLD AUTO: 7.21 K/UL — SIGNIFICANT CHANGE UP (ref 3.8–10.5)

## 2022-07-17 PROCEDURE — 73610 X-RAY EXAM OF ANKLE: CPT | Mod: 26,LT

## 2022-07-17 PROCEDURE — 99232 SBSQ HOSP IP/OBS MODERATE 35: CPT

## 2022-07-17 RX ADMIN — OXYCODONE HYDROCHLORIDE 10 MILLIGRAM(S): 5 TABLET ORAL at 09:14

## 2022-07-17 RX ADMIN — OXYCODONE HYDROCHLORIDE 10 MILLIGRAM(S): 5 TABLET ORAL at 08:14

## 2022-07-17 RX ADMIN — Medication 325 MILLIGRAM(S): at 13:03

## 2022-07-17 RX ADMIN — OXYCODONE HYDROCHLORIDE 10 MILLIGRAM(S): 5 TABLET ORAL at 19:56

## 2022-07-17 RX ADMIN — ONDANSETRON 4 MILLIGRAM(S): 8 TABLET, FILM COATED ORAL at 13:03

## 2022-07-17 RX ADMIN — OXYCODONE HYDROCHLORIDE 10 MILLIGRAM(S): 5 TABLET ORAL at 13:23

## 2022-07-17 RX ADMIN — OXYCODONE HYDROCHLORIDE 10 MILLIGRAM(S): 5 TABLET ORAL at 00:15

## 2022-07-17 RX ADMIN — OXYCODONE HYDROCHLORIDE 10 MILLIGRAM(S): 5 TABLET ORAL at 23:27

## 2022-07-17 RX ADMIN — OXYCODONE HYDROCHLORIDE 5 MILLIGRAM(S): 5 TABLET ORAL at 04:08

## 2022-07-17 RX ADMIN — OXYCODONE HYDROCHLORIDE 5 MILLIGRAM(S): 5 TABLET ORAL at 05:08

## 2022-07-17 RX ADMIN — Medication 100 MILLIGRAM(S): at 00:28

## 2022-07-17 RX ADMIN — OXYCODONE HYDROCHLORIDE 10 MILLIGRAM(S): 5 TABLET ORAL at 12:23

## 2022-07-17 RX ADMIN — OXYCODONE HYDROCHLORIDE 10 MILLIGRAM(S): 5 TABLET ORAL at 18:56

## 2022-07-17 RX ADMIN — Medication 100 MILLIGRAM(S): at 08:15

## 2022-07-17 NOTE — PHYSICAL THERAPY INITIAL EVALUATION ADULT - ADDITIONAL COMMENTS
Pt states she lives with her 2 daughters in elevator building with 4 ANTON. pt was independent with ADls and amb and denies use of AD

## 2022-07-17 NOTE — PROGRESS NOTE ADULT - ASSESSMENT
46F w obesity (30), p/w L ankle fx s/p mechanical fall s/p ORIF w Dr. Cottrell 7/16    #Post-op state - pain controlled. PPx: ASA per ortho. On bowel regimen and incentive spirometer  #L ankle fx - mgmt per ortho  #Obesity - BMI 30 - affects all aspects of care  #Normocytic anemia - 10.5 from 11.1. Appears asymptomatic    Recommend  Overall doing well.  PT Eval today  F/U CBC in AM if still here. If for DC please f/u CBC in 4 weeks as outpatient    DISPO: Pending PT eval

## 2022-07-17 NOTE — PHYSICAL THERAPY INITIAL EVALUATION ADULT - GENERAL OBSERVATIONS, REHAB EVAL
Pt received semi supine in bed +L LE splint +hep lock + SCD. PT received consent to treat from NEGRITA Tamez. Pt is post op ORIF NWB on L LE. Pt was left as received with call bell in reach, VSS, and in NAD.

## 2022-07-17 NOTE — PHYSICAL THERAPY INITIAL EVALUATION ADULT - RANGE OF MOTION EXAMINATION, REHAB EVAL
except L LE 2/2 splint  and post op ORIF on 7/16/bilateral upper extremity ROM was WFL (within functional limits)/bilateral lower extremity ROM was WFL (within functional limits)

## 2022-07-17 NOTE — PHYSICAL THERAPY INITIAL EVALUATION ADULT - PERTINENT HX OF CURRENT PROBLEM, REHAB EVAL
46 F denies pmh p/w L ankle pain s/p injury.  Pt reports eversion injury to L ankle while roller blading in [facility], fell but denies head trauma or loc.  reports ankle was popped back into place by medical staff in [facility] then splinted by EMS.  did not take anything for pain.  denies dizziness, fainting, neck/back pain, numbness/weakness, paresthesia, skin breaks, other injuries, use of AC.

## 2022-07-18 LAB
ANION GAP SERPL CALC-SCNC: 9 MMOL/L — SIGNIFICANT CHANGE UP (ref 5–17)
BUN SERPL-MCNC: 7 MG/DL — SIGNIFICANT CHANGE UP (ref 7–23)
CALCIUM SERPL-MCNC: 8.6 MG/DL — SIGNIFICANT CHANGE UP (ref 8.4–10.5)
CHLORIDE SERPL-SCNC: 106 MMOL/L — SIGNIFICANT CHANGE UP (ref 96–108)
CK MB CFR SERPL CALC: 1.6 NG/ML — SIGNIFICANT CHANGE UP (ref 0–6.7)
CK SERPL-CCNC: 244 U/L — HIGH (ref 25–170)
CO2 SERPL-SCNC: 26 MMOL/L — SIGNIFICANT CHANGE UP (ref 22–31)
CREAT SERPL-MCNC: 0.79 MG/DL — SIGNIFICANT CHANGE UP (ref 0.5–1.3)
EGFR: 92 ML/MIN/1.73M2 — SIGNIFICANT CHANGE UP
GLUCOSE SERPL-MCNC: 155 MG/DL — HIGH (ref 70–99)
HCT VFR BLD CALC: 32.6 % — LOW (ref 34.5–45)
HGB BLD-MCNC: 10.1 G/DL — LOW (ref 11.5–15.5)
LACTATE SERPL-SCNC: 1.1 MMOL/L — SIGNIFICANT CHANGE UP (ref 0.5–2)
MCHC RBC-ENTMCNC: 28.5 PG — SIGNIFICANT CHANGE UP (ref 27–34)
MCHC RBC-ENTMCNC: 31 GM/DL — LOW (ref 32–36)
MCV RBC AUTO: 92.1 FL — SIGNIFICANT CHANGE UP (ref 80–100)
NRBC # BLD: 0 /100 WBCS — SIGNIFICANT CHANGE UP (ref 0–0)
PLATELET # BLD AUTO: 177 K/UL — SIGNIFICANT CHANGE UP (ref 150–400)
POTASSIUM SERPL-MCNC: 3.6 MMOL/L — SIGNIFICANT CHANGE UP (ref 3.5–5.3)
POTASSIUM SERPL-SCNC: 3.6 MMOL/L — SIGNIFICANT CHANGE UP (ref 3.5–5.3)
RBC # BLD: 3.54 M/UL — LOW (ref 3.8–5.2)
RBC # FLD: 14 % — SIGNIFICANT CHANGE UP (ref 10.3–14.5)
SODIUM SERPL-SCNC: 141 MMOL/L — SIGNIFICANT CHANGE UP (ref 135–145)
TROPONIN T SERPL-MCNC: 0.01 NG/ML — SIGNIFICANT CHANGE UP (ref 0–0.01)
WBC # BLD: 6.44 K/UL — SIGNIFICANT CHANGE UP (ref 3.8–10.5)
WBC # FLD AUTO: 6.44 K/UL — SIGNIFICANT CHANGE UP (ref 3.8–10.5)

## 2022-07-18 PROCEDURE — 99233 SBSQ HOSP IP/OBS HIGH 50: CPT

## 2022-07-18 RX ORDER — ONDANSETRON 8 MG/1
4 TABLET, FILM COATED ORAL ONCE
Refills: 0 | Status: COMPLETED | OUTPATIENT
Start: 2022-07-18 | End: 2022-07-18

## 2022-07-18 RX ORDER — TRAMADOL HYDROCHLORIDE 50 MG/1
50 TABLET ORAL EVERY 6 HOURS
Refills: 0 | Status: DISCONTINUED | OUTPATIENT
Start: 2022-07-18 | End: 2022-07-19

## 2022-07-18 RX ORDER — TRAMADOL HYDROCHLORIDE 50 MG/1
25 TABLET ORAL EVERY 4 HOURS
Refills: 0 | Status: DISCONTINUED | OUTPATIENT
Start: 2022-07-18 | End: 2022-07-19

## 2022-07-18 RX ORDER — PANTOPRAZOLE SODIUM 20 MG/1
20 TABLET, DELAYED RELEASE ORAL ONCE
Refills: 0 | Status: COMPLETED | OUTPATIENT
Start: 2022-07-18 | End: 2022-07-18

## 2022-07-18 RX ADMIN — PANTOPRAZOLE SODIUM 20 MILLIGRAM(S): 20 TABLET, DELAYED RELEASE ORAL at 09:16

## 2022-07-18 RX ADMIN — TRAMADOL HYDROCHLORIDE 50 MILLIGRAM(S): 50 TABLET ORAL at 12:58

## 2022-07-18 RX ADMIN — ONDANSETRON 4 MILLIGRAM(S): 8 TABLET, FILM COATED ORAL at 21:45

## 2022-07-18 RX ADMIN — Medication 325 MILLIGRAM(S): at 12:58

## 2022-07-18 RX ADMIN — TRAMADOL HYDROCHLORIDE 50 MILLIGRAM(S): 50 TABLET ORAL at 21:44

## 2022-07-18 RX ADMIN — OXYCODONE HYDROCHLORIDE 10 MILLIGRAM(S): 5 TABLET ORAL at 06:53

## 2022-07-18 RX ADMIN — OXYCODONE HYDROCHLORIDE 10 MILLIGRAM(S): 5 TABLET ORAL at 07:53

## 2022-07-18 RX ADMIN — ONDANSETRON 4 MILLIGRAM(S): 8 TABLET, FILM COATED ORAL at 10:18

## 2022-07-18 RX ADMIN — OXYCODONE HYDROCHLORIDE 10 MILLIGRAM(S): 5 TABLET ORAL at 00:27

## 2022-07-18 RX ADMIN — TRAMADOL HYDROCHLORIDE 50 MILLIGRAM(S): 50 TABLET ORAL at 22:44

## 2022-07-18 RX ADMIN — TRAMADOL HYDROCHLORIDE 50 MILLIGRAM(S): 50 TABLET ORAL at 13:58

## 2022-07-18 NOTE — PROVIDER CONTACT NOTE (CHANGE IN STATUS NOTIFICATION) - ASSESSMENT
patient complains of chest pain associated with chest heaviness, dizziness while lying in bed. VSS, denies SOB.

## 2022-07-18 NOTE — PROGRESS NOTE ADULT - ASSESSMENT
46F w obesity (30), p/w L ankle fx s/p mechanical fall s/p ORIF w Dr. Cottrell 7/16    #Post-op state - pain controlled. PPx: ASA per ortho. On bowel regimen and incentive spirometer  #Chest pain - see below  #L ankle fx - mgmt per ortho  #Obesity - BMI 30 - affects all aspects of care  #Normocytic anemia - 10.1 from 10.5 from 11.1. Appears asymptomatic    Recommend  IV Zofran now. F/U Chest discomfort. Troponin negative reassuring. Similar EKG vs pre-op. Ddx also includes reflux/GI process  Reassess this morning for improvement in chest pain.     DISPO: HPT w RW

## 2022-07-19 ENCOUNTER — TRANSCRIPTION ENCOUNTER (OUTPATIENT)
Age: 47
End: 2022-07-19

## 2022-07-19 VITALS
TEMPERATURE: 98 F | RESPIRATION RATE: 16 BRPM | OXYGEN SATURATION: 98 % | SYSTOLIC BLOOD PRESSURE: 114 MMHG | HEART RATE: 79 BPM | DIASTOLIC BLOOD PRESSURE: 71 MMHG

## 2022-07-19 PROCEDURE — C1769: CPT

## 2022-07-19 PROCEDURE — 97530 THERAPEUTIC ACTIVITIES: CPT

## 2022-07-19 PROCEDURE — 85610 PROTHROMBIN TIME: CPT

## 2022-07-19 PROCEDURE — 85027 COMPLETE CBC AUTOMATED: CPT

## 2022-07-19 PROCEDURE — 86900 BLOOD TYPING SEROLOGIC ABO: CPT

## 2022-07-19 PROCEDURE — 97162 PT EVAL MOD COMPLEX 30 MIN: CPT

## 2022-07-19 PROCEDURE — G1004: CPT

## 2022-07-19 PROCEDURE — 97116 GAIT TRAINING THERAPY: CPT

## 2022-07-19 PROCEDURE — 93005 ELECTROCARDIOGRAM TRACING: CPT

## 2022-07-19 PROCEDURE — 96374 THER/PROPH/DIAG INJ IV PUSH: CPT

## 2022-07-19 PROCEDURE — 96375 TX/PRO/DX INJ NEW DRUG ADDON: CPT

## 2022-07-19 PROCEDURE — 85025 COMPLETE CBC W/AUTO DIFF WBC: CPT

## 2022-07-19 PROCEDURE — 99232 SBSQ HOSP IP/OBS MODERATE 35: CPT

## 2022-07-19 PROCEDURE — 73700 CT LOWER EXTREMITY W/O DYE: CPT | Mod: MG

## 2022-07-19 PROCEDURE — 87635 SARS-COV-2 COVID-19 AMP PRB: CPT

## 2022-07-19 PROCEDURE — 71046 X-RAY EXAM CHEST 2 VIEWS: CPT

## 2022-07-19 PROCEDURE — 73564 X-RAY EXAM KNEE 4 OR MORE: CPT

## 2022-07-19 PROCEDURE — 81025 URINE PREGNANCY TEST: CPT

## 2022-07-19 PROCEDURE — 82553 CREATINE MB FRACTION: CPT

## 2022-07-19 PROCEDURE — 36415 COLL VENOUS BLD VENIPUNCTURE: CPT

## 2022-07-19 PROCEDURE — 84702 CHORIONIC GONADOTROPIN TEST: CPT

## 2022-07-19 PROCEDURE — 83605 ASSAY OF LACTIC ACID: CPT

## 2022-07-19 PROCEDURE — 85730 THROMBOPLASTIN TIME PARTIAL: CPT

## 2022-07-19 PROCEDURE — 73590 X-RAY EXAM OF LOWER LEG: CPT

## 2022-07-19 PROCEDURE — C1713: CPT

## 2022-07-19 PROCEDURE — 73610 X-RAY EXAM OF ANKLE: CPT

## 2022-07-19 PROCEDURE — 86850 RBC ANTIBODY SCREEN: CPT

## 2022-07-19 PROCEDURE — 99285 EMERGENCY DEPT VISIT HI MDM: CPT

## 2022-07-19 PROCEDURE — 80048 BASIC METABOLIC PNL TOTAL CA: CPT

## 2022-07-19 PROCEDURE — 82550 ASSAY OF CK (CPK): CPT

## 2022-07-19 PROCEDURE — 84484 ASSAY OF TROPONIN QUANT: CPT

## 2022-07-19 PROCEDURE — 86901 BLOOD TYPING SEROLOGIC RH(D): CPT

## 2022-07-19 RX ORDER — ONDANSETRON 8 MG/1
1 TABLET, FILM COATED ORAL
Qty: 21 | Refills: 0
Start: 2022-07-19 | End: 2022-07-25

## 2022-07-19 RX ORDER — TRAMADOL HYDROCHLORIDE 50 MG/1
0.5 TABLET ORAL
Qty: 21 | Refills: 0
Start: 2022-07-19 | End: 2022-07-25

## 2022-07-19 RX ADMIN — ONDANSETRON 4 MILLIGRAM(S): 8 TABLET, FILM COATED ORAL at 14:01

## 2022-07-19 RX ADMIN — Medication 325 MILLIGRAM(S): at 14:01

## 2022-07-19 RX ADMIN — TRAMADOL HYDROCHLORIDE 50 MILLIGRAM(S): 50 TABLET ORAL at 09:27

## 2022-07-19 NOTE — PROGRESS NOTE ADULT - ASSESSMENT
This is a 45yo woman with a PMH of obesity (BMI 30) who p/w L ankle fx s/p mechanical fall s/p ORIF w/Dr. Cottrell on 7/16.  Clinically doing well and awaiting PT clearance for discharge.     #Post-op state - pain controlled. PPx: ASA per ortho. On bowel regimen and incentive spirometer  #L ankle fx - mgmt per Ortho; PT re-eval for today   #Chest pain - resolved  #Obesity -affects all aspects of care  #Normocytic anemia - 10.1 from 10.5 from 11.1. Appears asymptomatic    DISPO: HPT w RW; medically cleared from Medicine's standpoint.    Debra Amador MD  Hospitalist Attending  [phone]

## 2022-07-19 NOTE — PROGRESS NOTE ADULT - SUBJECTIVE AND OBJECTIVE BOX
Ortho Note    Pt seen and examined on morning rounds. Pt comfortable without complaints, pain controlled.  Denies CP, SOB, N/V, numbness/tingling     Vital Signs Last 24 Hrs  T(C): 36.8 (07-17-22 @ 05:40), Max: 36.8 (07-17-22 @ 05:40)  T(F): 98.3 (07-17-22 @ 05:40), Max: 98.3 (07-17-22 @ 05:40)  HR: 74 (07-17-22 @ 05:40) (74 - 74)  BP: 120/70 (07-17-22 @ 05:40) (120/70 - 120/70)  BP(mean): --  RR: 16 (07-17-22 @ 05:40) (16 - 16)  SpO2: 98% (07-17-22 @ 05:40) (98% - 98%)  I&O's Summary    16 Jul 2022 07:01  -  17 Jul 2022 07:00  --------------------------------------------------------  IN: 400 mL / OUT: 500 mL / NET: -100 mL        Physical Exam:  General: Pt Alert and oriented, NAD  LLE:  DSG C/D/I, AO Splint  Wwp, cap refill <3 sec  Sensation: SILT SPN/DPN/Tibial, remainder limited by splint  Motor: EHL/FHL firing, remainder limited by splint                          10.5   7.21  )-----------( 183      ( 17 Jul 2022 05:30 )             33.6     07-17    143  |  108  |  8   ----------------------------<  110<H>  3.6   |  25  |  0.86    Ca    8.6      17 Jul 2022 05:30        A/P: 46yFemale POD#1 s/p ORIF and arthroscopy of Left trimalleolar ankle fracture  - Stable  - Pain Control  - DVT ppx:  daily  - Post op abx: periop ancef  - PT, WBS: BILL LLE  - Dispo: Home pending PTC    Luke Loco, PGY-2  Ortho Pager [phone]
Ortho Note    Pt seen and examined on morning rounds. Pt comfortable without complaints, pain controlled.  Denies CP, SOB, N/V, numbness/tingling     Vital Signs Last 24 Hrs  T(C): 36.8 (19 Jul 2022 16:03), Max: 37.5 (18 Jul 2022 18:45)  T(F): 98.2 (19 Jul 2022 16:03), Max: 99.5 (18 Jul 2022 18:45)  HR: 79 (19 Jul 2022 16:03) (76 - 83)  BP: 114/71 (19 Jul 2022 16:03) (111/75 - 123/78)  BP(mean): 88 (19 Jul 2022 08:21) (87 - 93)  RR: 16 (19 Jul 2022 16:03) (16 - 16)  SpO2: 98% (19 Jul 2022 16:03) (92% - 100%)    Physical Exam:  General: Pt Alert and oriented, NAD  LLE:  DSG C/D/I, AO Splint  Wwp, cap refill <3 sec  Sensation: SILT SPN/DPN/Tibial, remainder limited by splint  Motor: EHL/FHL firing, remainder limited by splint    A/P: 46yFemale s/p ORIF and arthroscopy of Left trimalleolar ankle fracture on 7/16.  - Stable  - Pain Control  - DVT ppx:  daily  - PT, WBS: NWB LLE  - Dispo: Home pending PTC
Orthopaedic Surgery Progress Note    Patient seen and evaluated this AM at 830a after receiving a call from nursing staff that pt c/o chest heaviness and dizziness x 1 hour   Patient is POD #2 s/p left ankle ORIF  Patient states that she took oxycodone at approximately 7am and felt tired. She fell asleep and was woken up shortly thereafter and began feeling chest heaviness and dizziness.   Vitals recorded by nursing staff within normal limits  EKG performed and reviewed by hospitalist. Lactate and troponin drawn and are WNL  Patient given IV pantoprazole, will continue to monitor and appreciate recommendations per medical team.  Patient reports that her left ankle pain is well controlled, she denies SOB, N/V, tactile fevers, calf pain      Vital Signs Last 24 Hrs  T(C): 36.8 (18 Jul 2022 08:03), Max: 37 (17 Jul 2022 17:12)  T(F): 98.3 (18 Jul 2022 08:03), Max: 98.6 (17 Jul 2022 17:12)  HR: 83 (18 Jul 2022 08:03) (75 - 85)  BP: 127/75 (18 Jul 2022 08:03) (100/64 - 127/75)  BP(mean): 93 (18 Jul 2022 08:03) (80 - 93)  RR: 18 (18 Jul 2022 08:03) (16 - 18)  SpO2: 100% (18 Jul 2022 08:03) (93% - 100%)    Parameters below as of 18 Jul 2022 08:03  Patient On (Oxygen Delivery Method): room air        Physical Exam:  Pt laying comfortably in bed, NAD.  Skin warm and well perfused, no visible erythema/ecchymoses.  Dressing C/D/I - left lower extremity splint in place  EHL/FHL 5/5 left lower extremity  SLT in tact to distal left lower extremity  Calves soft and nontender to palpation  Brisk capillary refill distally     LABS                        10.1   6.44  )-----------( 177      ( 18 Jul 2022 05:30 )             32.6                                07-18    141  |  106  |  7   ----------------------------<  155<H>  3.6   |  26  |  0.79    Ca    8.6      18 Jul 2022 05:30            A/P: 46F POD #2 s/p left ankle ORIF     CONTINUE:        1. PT: NWB LLE   2. DVT prophylaxis: ASA , SCD  3. Pain Control as needed   4. Will continue to monitor current symptoms, appreciate medicine recs   5. Dispo: Home pending PT clearance and medical optimization       
INTERVAL HPI/OVERNIGHT EVENTS: BLANK O/N    SUBJECTIVE: Patient seen and examined at bedside.   Pt reports mid sternal chest heaviness this morning after eating and taking oxycodone. States this has happened once after she had COVID. States she feels better sitting up. Denies any h/o cardiac issues, pericarditis, myocarditis. Associated w nausea. No fever, lightheadedness, dizziness, dyspnea, palpitations, abd pain. +Flatus. Voiding wo dysuria. States pain in leg is controlled wo numbness.    EKG obtained - NSR - T wave inversion in II, V2-V4 which were seen in pre-op EKG as well. No ST elevations.   Trop T 0.01, CKMB 1.6    OBJECTIVE:    VITAL SIGNS:  ICU Vital Signs Last 24 Hrs  T(C): 36.8 (18 Jul 2022 08:03), Max: 37 (17 Jul 2022 17:12)  T(F): 98.3 (18 Jul 2022 08:03), Max: 98.6 (17 Jul 2022 17:12)  HR: 83 (18 Jul 2022 08:03) (75 - 85)  BP: 127/75 (18 Jul 2022 08:03) (100/64 - 127/75)  BP(mean): 93 (18 Jul 2022 08:03) (80 - 93)  ABP: --  ABP(mean): --  RR: 18 (18 Jul 2022 08:03) (16 - 18)  SpO2: 100% (18 Jul 2022 08:03) (93% - 100%)    O2 Parameters below as of 18 Jul 2022 08:03  Patient On (Oxygen Delivery Method): room air              07-17 @ 07:01  -  07-18 @ 07:00  --------------------------------------------------------  IN: 360 mL / OUT: 0 mL / NET: 360 mL      CAPILLARY BLOOD GLUCOSE          PHYSICAL EXAM:  GEN: female in NAD on RA  HEENT: NC/AT, MMM  CV: RRR, nml S1S2, no murmurs, no rubs  PULM: nml effort, CTAB  ABD: Soft, non-distended, NABS, non-tender  NEURO  A/O x3, moving all extremities, Sensation intact  LLE: in splint. moving all toes.   PSYCH: Appropriate    MEDICATIONS:  MEDICATIONS  (STANDING):  aspirin 325 milliGRAM(s) Oral daily  ondansetron    Tablet 4 milliGRAM(s) Oral every 8 hours  sodium chloride 0.9%. 1000 milliLiter(s) (100 mL/Hr) IV Continuous <Continuous>    MEDICATIONS  (PRN):  HYDROmorphone  Injectable 0.5 milliGRAM(s) IV Push every 4 hours PRN breakthrough pain  HYDROmorphone  Injectable 0.5 milliGRAM(s) IV Push every 15 minutes PRN pacu  oxyCODONE    IR 5 milliGRAM(s) Oral every 4 hours PRN Moderate Pain (4 - 6)  oxyCODONE    IR 10 milliGRAM(s) Oral every 4 hours PRN Severe Pain (7 - 10)      ALLERGIES:  Allergies    No Known Allergies    Intolerances        LABS:                        10.1   6.44  )-----------( 177      ( 18 Jul 2022 05:30 )             32.6     07-18    141  |  106  |  7   ----------------------------<  155<H>  3.6   |  26  |  0.79    Ca    8.6      18 Jul 2022 05:30            RADIOLOGY & ADDITIONAL TESTS: Reviewed.
Ortho Note    Pt seen and examined on morning rounds. Pt comfortable without complaints, pain controlled.  Denies CP, SOB, N/V, numbness/tingling     Vital Signs Last 24 Hrs  T(C): 36.8 (18 Jul 2022 08:03), Max: 37 (17 Jul 2022 17:12)  T(F): 98.3 (18 Jul 2022 08:03), Max: 98.6 (17 Jul 2022 17:12)  HR: 83 (18 Jul 2022 08:03) (75 - 85)  BP: 127/75 (18 Jul 2022 08:03) (100/64 - 127/75)  BP(mean): 93 (18 Jul 2022 08:03) (80 - 93)  RR: 18 (18 Jul 2022 08:03) (16 - 18)  SpO2: 100% (18 Jul 2022 08:03) (93% - 100%)    Physical Exam:  General: Pt Alert and oriented, NAD  LLE:  DSG C/D/I, AO Splint  Wwp, cap refill <3 sec  Sensation: SILT SPN/DPN/Tibial, remainder limited by splint  Motor: EHL/FHL firing, remainder limited by splint    A/P: 46yFemale s/p ORIF and arthroscopy of Left trimalleolar ankle fracture on 7/16.  - Stable  - Pain Control  - DVT ppx:  daily  - PT, WBS: NWB LLE  - Dispo: Home pending PTC
Ortho Note    Pt seen and examined on morning rounds. Pt with L ankle pain pending OR today.   Denies CP, SOB, N/V, numbness/tingling     Vital Signs Last 24 Hrs  T(C): 36.8 (07-16-22 @ 05:18), Max: 36.8 (07-16-22 @ 05:18)  T(F): 98.3 (07-16-22 @ 05:18), Max: 98.3 (07-16-22 @ 05:18)  HR: 69 (07-16-22 @ 05:18) (69 - 69)  BP: 109/69 (07-16-22 @ 05:18) (109/69 - 109/69)  BP(mean): 83 (07-16-22 @ 05:18) (83 - 83)  RR: 16 (07-16-22 @ 05:18) (16 - 16)  SpO2: 100% (07-16-22 @ 05:18) (100% - 100%)  I&O's Summary      Physical Exam:  General: Pt Alert and oriented, NAD  L ankle in AO splint, moderately swollen, + wrinkle sign  Pulses: toes wwp, cap refill <3 seconds  Sensation: SILT sp/dp/ tibial  distributions  Motor: EHL/FHL firing                          11.1   6.67  )-----------( 195      ( 16 Jul 2022 05:54 )             35.2     07-16    142  |  107  |  11  ----------------------------<  103<H>  3.9   |  24  |  0.77    Ca    9.0      16 Jul 2022 05:54        A/P: 46yFemale with trimalleolar fracture, pending OR for L ankle ORIF 7/16  - admit  - pain control  - NPO   - WBS: NWB LLE in AO splint, strict elevation  - Dispo: pending OR      Luke Loco, PGY-2  Ortho Pager [phone]
Ortho Post Op Check    Procedure: ORIF and arthroscopy of Left trimalleolar ankle fracture  Surgeon: Ronit    Pt comfortable and pain controlled. Endorses some nausea. Denies CP, SOB, N/V, numbness/tingling or other symptoms at this time.     Vital Signs Last 24 Hrs  T(C): 36.9 (07-16-22 @ 15:36), Max: 36.9 (07-16-22 @ 15:36)  T(F): 98.5 (07-16-22 @ 15:36), Max: 98.5 (07-16-22 @ 15:36)  HR: 61 (07-16-22 @ 15:36) (61 - 94)  BP: 117/74 (07-16-22 @ 15:36) (108/57 - 132/64)  BP(mean): 76 (07-16-22 @ 14:30) (76 - 91)  RR: 17 (07-16-22 @ 15:36) (16 - 18)  SpO2: 100% (07-16-22 @ 15:36) (100% - 100%)      Physical Exam:  General: Pt Alert and oriented, NAD  LLE:  DSG C/D/I, AO Splint  Wwp, cap refill <3 sec  Sensation: SILT SPN/DPN/Tibial, remainder limited by splint  Motor: EHL/FHL/TA firing, remainder limited by splint                          11.1   6.67  )-----------( 195      ( 16 Jul 2022 05:54 )             35.2     07-16    142  |  107  |  11  ----------------------------<  103<H>  3.9   |  24  |  0.77    Ca    9.0      16 Jul 2022 05:54      Intraoperative flouro shows hardware in place.    A/P: 46yFemale POD#0 s/p ORIF and arthroscopy of Left trimalleolar ankle fracture  - Stable  - Pain Control  - DVT ppx:  daily  - Post op abx: periop ancef  - PT, WBS: NWB LLE  - Dispo: Home pending PT    
Orthopaedic Surgery Progress Note    Patient seen and examined. BLANK. Patient without complaints. Pain controlled. Denies CP, SOB, N/V, tactile fevers, calf pain.  Pt is POD #3 s/p left ankle ORIF     Vital Signs Last 24 Hrs  T(C): 36.9 (19 Jul 2022 08:21), Max: 37.5 (18 Jul 2022 18:45)  T(F): 98.5 (19 Jul 2022 08:21), Max: 99.5 (18 Jul 2022 18:45)  HR: 76 (19 Jul 2022 08:21) (72 - 83)  BP: 117/73 (19 Jul 2022 08:21) (107/62 - 123/78)  BP(mean): 88 (19 Jul 2022 08:21) (87 - 93)  RR: 16 (19 Jul 2022 08:21) (16 - 16)  SpO2: 97% (19 Jul 2022 08:21) (92% - 100%)    Parameters below as of 19 Jul 2022 08:21  Patient On (Oxygen Delivery Method): room air      Physical Exam:  Pt laying comfortably in bed, NAD.  Skin warm and well perfused, no visible erythema/ecchymoses.  Dressing C/D/I; LLE splint in tact   EHL/FHL firing left lower extremity  SLT in tact to distal left lower extremity  Brisk capillary refill distal bilateral lower extremities     LABS                        10.1   6.44  )-----------( 177      ( 18 Jul 2022 05:30 )             32.6                                07-18    141  |  106  |  7   ----------------------------<  155<H>  3.6   |  26  |  0.79    Ca    8.6      18 Jul 2022 05:30      A/P: 46F POD #3 s/p left ankle ORIF     CONTINUE:        1. PT: NWB LLE  2. DVT prophylaxis:  QD, SCD to contralateral extremity   3. Pain Control as needed   4. Dispo: home pending PT clearance       
INTERVAL EVENTS:  -- NAEO    SUBJECTIVE:  -- denies CP, SOB and nausea; symptoms of chest discomfort yesterday resolved after adjustments to analgesics  -- ambulated to the bathroom with a walker; worked with the scooter w/PT yesterday  -- voiding, passing flatus, tolerating food   -- Review of Systems: 12 point review of systems otherwise negative    MEDICATIONS:  MEDICATIONS  (STANDING):  aspirin 325 milliGRAM(s) Oral daily  ondansetron    Tablet 4 milliGRAM(s) Oral every 8 hours  sodium chloride 0.9%. 1000 milliLiter(s) (100 mL/Hr) IV Continuous <Continuous>    MEDICATIONS  (PRN):  HYDROmorphone  Injectable 0.5 milliGRAM(s) IV Push every 4 hours PRN breakthrough pain  HYDROmorphone  Injectable 0.5 milliGRAM(s) IV Push every 15 minutes PRN pacu  traMADol 25 milliGRAM(s) Oral every 4 hours PRN Moderate Pain (4 - 6)  traMADol 50 milliGRAM(s) Oral every 6 hours PRN Severe Pain (7 - 10)    Allergies  No Known Allergies    OBJECTIVE:  Vital Signs Last 24 Hrs  T(C): 36.9 (19 Jul 2022 08:21), Max: 37.5 (18 Jul 2022 18:45)  T(F): 98.5 (19 Jul 2022 08:21), Max: 99.5 (18 Jul 2022 18:45)  HR: 76 (19 Jul 2022 08:21) (72 - 83)  BP: 117/73 (19 Jul 2022 08:21) (107/62 - 123/78)  BP(mean): 88 (19 Jul 2022 08:21) (87 - 93)  RR: 16 (19 Jul 2022 08:21) (16 - 16)  SpO2: 97% (19 Jul 2022 08:21) (92% - 100%)    Parameters below as of 19 Jul 2022 08:21  Patient On (Oxygen Delivery Method): room air    PHYSICAL EXAM:  Gen: NAD, sitting upright in bed  HEENT: NCAT, MMM, clear OP  Neck: supple, trachea at midline  CV: RRR, no m/g/r appreciated  Pulm: CTA B, no w/r/r; no increase in WOB  Abd: normoactive BS, soft, NTND  Ext: WWP, no c/c/e; LLE +boot  Neuro: AOx3, CN II-XII grossly intact; nonfocal  Psych: pleasant, conversant and appropriate    LABS:                        10.1   6.44  )-----------( 177      ( 18 Jul 2022 05:30 )             32.6     07-18    141  |  106  |  7   ----------------------------<  155<H>  3.6   |  26  |  0.79    Ca    8.6      18 Jul 2022 05:30    MICRODATA:  -- No new microdata.    RADIOLOGY/OTHER STUDIES:  -- No new imaging.
INTERVAL HPI/OVERNIGHT EVENTS: BLANK o/n    SUBJECTIVE: Patient seen and examined at bedside.   Pt feels well. Pain controlled. No fever, chest pain, dyspnea, N/V/Abd pain. Eating. Voiding wo dysuria. +BM yesterday. Denies any LH/Dizziness    OBJECTIVE:    VITAL SIGNS:  ICU Vital Signs Last 24 Hrs  T(C): 36.8 (17 Jul 2022 05:40), Max: 37.1 (17 Jul 2022 00:28)  T(F): 98.3 (17 Jul 2022 05:40), Max: 98.8 (17 Jul 2022 00:28)  HR: 74 (17 Jul 2022 05:40) (61 - 94)  BP: 120/70 (17 Jul 2022 05:40) (104/67 - 132/64)  BP(mean): 76 (16 Jul 2022 14:30) (76 - 91)  ABP: --  ABP(mean): --  RR: 16 (17 Jul 2022 05:40) (16 - 18)  SpO2: 98% (17 Jul 2022 05:40) (94% - 100%)    O2 Parameters below as of 17 Jul 2022 05:40  Patient On (Oxygen Delivery Method): room air              07-16 @ 07:01 - 07-17 @ 07:00  --------------------------------------------------------  IN: 400 mL / OUT: 500 mL / NET: -100 mL    07-17 @ 07:01  -  07-17 @ 09:35  --------------------------------------------------------  IN: 360 mL / OUT: 0 mL / NET: 360 mL      CAPILLARY BLOOD GLUCOSE          PHYSICAL EXAM:  GEN: female in NAD on RA  HEENT: NC/AT, MMM  CV: RRR, nml S1S2, no murmurs  PULM: nml effort, CTAB  ABD: Soft, non-distended, NABS, non-tender  NEURO  A/O x3, moving all extremities, Sensation intact  LLE: in splint. moving all toes.   PSYCH: Appropriate      MEDICATIONS:  MEDICATIONS  (STANDING):  aspirin 325 milliGRAM(s) Oral daily  ondansetron    Tablet 4 milliGRAM(s) Oral every 8 hours  sodium chloride 0.9%. 1000 milliLiter(s) (100 mL/Hr) IV Continuous <Continuous>    MEDICATIONS  (PRN):  HYDROmorphone  Injectable 0.5 milliGRAM(s) IV Push every 4 hours PRN breakthrough pain  HYDROmorphone  Injectable 0.5 milliGRAM(s) IV Push every 15 minutes PRN pacu  oxyCODONE    IR 5 milliGRAM(s) Oral every 4 hours PRN Moderate Pain (4 - 6)  oxyCODONE    IR 10 milliGRAM(s) Oral every 4 hours PRN Severe Pain (7 - 10)      ALLERGIES:  Allergies    No Known Allergies    Intolerances        LABS:                        10.5   7.21  )-----------( 183      ( 17 Jul 2022 05:30 )             33.6     07-17    143  |  108  |  8   ----------------------------<  110<H>  3.6   |  25  |  0.86    Ca    8.6      17 Jul 2022 05:30      PT/INR - ( 15 Jul 2022 19:00 )   PT: 12.0 sec;   INR: 1.01          PTT - ( 15 Jul 2022 19:00 )  PTT:30.4 sec      RADIOLOGY & ADDITIONAL TESTS: Reviewed.

## 2022-07-19 NOTE — PROGRESS NOTE ADULT - PROVIDER SPECIALTY LIST ADULT
Orthopedics
Hospitalist
Hospitalist
Orthopedics
Hospitalist

## 2022-07-19 NOTE — DISCHARGE NOTE NURSING/CASE MANAGEMENT/SOCIAL WORK - PATIENT PORTAL LINK FT
You can access the FollowMyHealth Patient Portal offered by [facility] by registering at the following website: [email or web address]. By joining [facility]’s FollowMyHealth portal, you will also be able to view your health information using other applications (apps) compatible with our system.

## 2022-07-19 NOTE — DISCHARGE NOTE NURSING/CASE MANAGEMENT/SOCIAL WORK - NSDCPEFALRISK_GEN_ALL_CORE
For information on Fall & Injury Prevention, visit: [email or web address] OR  [email or web address] OR  https://www.cdc.gov/steadi/patient.html

## 2022-07-25 DIAGNOSIS — S82.852A DISPLACED TRIMALLEOLAR FRACTURE OF LEFT LOWER LEG, INITIAL ENCOUNTER FOR CLOSED FRACTURE: ICD-10-CM
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
## 2022-07-25 DIAGNOSIS — E66.9 OBESITY, UNSPECIFIED: ICD-10-CM

## 2022-07-25 DIAGNOSIS — Z86.16 PERSONAL HISTORY OF COVID-19: ICD-10-CM

## 2022-07-25 DIAGNOSIS — K21.9 GASTRO-ESOPHAGEAL REFLUX DISEASE WITHOUT ESOPHAGITIS: ICD-10-CM

## 2022-07-25 DIAGNOSIS — A60.00 HERPESVIRAL INFECTION OF UROGENITAL SYSTEM, UNSPECIFIED: ICD-10-CM

## 2022-07-25 DIAGNOSIS — D50.9 IRON DEFICIENCY ANEMIA, UNSPECIFIED: ICD-10-CM
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
## 2023-01-27 ENCOUNTER — APPOINTMENT (OUTPATIENT)
Dept: HEART AND VASCULAR | Facility: CLINIC | Age: 48
End: 2023-01-27
Payer: COMMERCIAL

## 2023-01-27 VITALS
BODY MASS INDEX: 29.99 KG/M2 | HEIGHT: 65 IN | SYSTOLIC BLOOD PRESSURE: 121 MMHG | DIASTOLIC BLOOD PRESSURE: 81 MMHG | HEART RATE: 75 BPM | TEMPERATURE: 97.2 F | WEIGHT: 180 LBS | OXYGEN SATURATION: 99 %

## 2023-01-27 DIAGNOSIS — Z82.49 FAMILY HISTORY OF ISCHEMIC HEART DISEASE AND OTHER DISEASES OF THE CIRCULATORY SYSTEM: ICD-10-CM

## 2023-01-27 DIAGNOSIS — R07.9 CHEST PAIN, UNSPECIFIED: ICD-10-CM

## 2023-01-27 DIAGNOSIS — Z82.3 FAMILY HISTORY OF STROKE: ICD-10-CM

## 2023-01-27 DIAGNOSIS — Z80.6 FAMILY HISTORY OF LEUKEMIA: ICD-10-CM

## 2023-01-27 PROBLEM — Z78.9 OTHER SPECIFIED HEALTH STATUS: Chronic | Status: ACTIVE | Noted: 2022-07-15

## 2023-01-27 PROCEDURE — 99204 OFFICE O/P NEW MOD 45 MIN: CPT | Mod: 25

## 2023-01-27 PROCEDURE — 93000 ELECTROCARDIOGRAM COMPLETE: CPT

## 2023-01-27 PROCEDURE — 36415 COLL VENOUS BLD VENIPUNCTURE: CPT

## 2023-01-27 RX ORDER — LANSOPRAZOLE 15 MG/1
CAPSULE, DELAYED RELEASE ORAL
Refills: 0 | Status: COMPLETED | COMMUNITY
End: 2023-01-27

## 2023-01-27 RX ORDER — BUDESONIDE AND FORMOTEROL FUMARATE DIHYDRATE 160; 4.5 UG/1; UG/1
160-4.5 AEROSOL RESPIRATORY (INHALATION)
Refills: 0 | Status: COMPLETED | COMMUNITY
End: 2023-01-27

## 2023-01-27 NOTE — PHYSICAL EXAM
[Normal Conjunctiva] : normal conjunctiva [Normal Venous Pressure] : normal venous pressure [No Carotid Bruit] : no carotid bruit [Normal S1, S2] : normal S1, S2 [Soft] : abdomen soft [Normal Gait] : normal gait [Normal PT B/L] : normal PT B/L [Normal] : alert and oriented, normal memory [de-identified] : 1/6 systolic murmur heard best at apex

## 2023-01-27 NOTE — REVIEW OF SYSTEMS
[SOB] : shortness of breath [Chest Discomfort] : chest discomfort [Dizziness] : dizziness [Fever] : no fever [Chills] : no chills [Dyspnea on exertion] : not dyspnea during exertion [Lower Ext Edema] : no extremity edema [Leg Claudication] : no intermittent leg claudication [Palpitations] : no palpitations [Orthopnea] : no orthopnea [PND] : no PND [Syncope] : no syncope [Cough] : no cough [Abdominal Pain] : no abdominal pain

## 2023-01-27 NOTE — HISTORY OF PRESENT ILLNESS
[FreeTextEntry1] : Leonor Bermudez is a 46 yo W with hx of HLD and vertigo who presents today for evaluation of chest pain.\par \par She presented to [facility] on 01/22/23 with chest pain. Around 3 am she woke up with a sharp pain under her left breast, lasting seconds at a time. Once the sharp pain subsided she developed a dull pain. Several minutes after the pain went away she experienced left forearm numbness and some tightness in her neck.\par \par She experienced a similar type of chest pain on 1/17/23. She woke up at 4 am with a sharp pain under her left breast. The pain lasted several seconds then resolved spontaneously. No radiation of the pain. \par \par On 1/19, she had dizziness upon waking up. She has a hx of vertigo but reports this didn't feel the same. The dizziness persisted for about an hour before resolving spontaneously.\par \par She had COVID twice (03/2020, 03/2021) and since then she reports central chest tightness and difficulty breathing only when she is in the heat. She saw Dr. Barajas and had a full pulmonary workup which was normal. She does take an albuterol inhaler PRN but she hasn't used it in over a year.\par \par Pt. denies any orthopnea, PND, palpitations, syncope or lower extremity edema. \par \par She was on Crestor 10 mg for one year. She reports occasional joint pain of the medication. 07/2022 she had surgery on her left ankle and she stopped the statin because she feared it would delay her recovery. She is open to re-starting the medication now that her ankle is healed. \par \par She had a stress test several years ago which was normal (per pt). She has not had an echocardiogram. \par \par Activity: She is getting PT for her ankle twice/week. She also walks on the treadmill (20 minutes) and rides the stationary bike (20 minutes) 2-3 days per week. No exertional chest pain or shortness of breath.\par \par She monitors her BP at home on occasion with readings normally 120s/70s.\par \par She gained 12 lbs since 2020.\par \par ============================================\par Lifestyle History:\par Mediterranean Diet Score (9 question survey) was 4.\par (8-9: optimal, 6-7: near-optimal, 4-5: suboptimal, 0-3: markedly suboptimal)\par Exercise: Patient reports exercising at a moderate level for <30 minutes per week. \par Never smoker\par No stress \par She snores but reports no daytime sleepiness\par \par OB/GYN Hx:\par No PCOS	\par Pregnant 3 times, no complications \par Did you have any complications during pregnancy?\par No menopause\par No HRT

## 2023-01-27 NOTE — ASSESSMENT
[FreeTextEntry1] : Leonor Bermudez is a 48 yo W with hx of HLD and vertigo who presents today for evaluation of chest pain.\par \par Chest pain:\par - EKG today: NSR @ 75 bpm \par - recent ER eval negative\par - Will pursue further ischemic workout with CCTA to r/o signficant CAD given risk factors of HL and no longer on statin\par - CMP today to check renal fx/electrolytes prior to testing \par - Metoprolol succinate 25 mg sent to pharmacy as premedication \par - Will order echo to assess for any structural or functional abnormalities \par \par HLD/ASCVD risk reduction:\par - Lipid panel today \par - Pending CCTA/lipids will determine whether to re-start pt. on her statin medication \par - A1c today \par - BP well-controlled\par - Encouraged patient to continue healthy exercise and eating habits, focusing on a Mediterranean style of eating and aiming for the recommended 150 minutes per week of moderate physical activity \par \par Pt. to RTC for echocardiogram.

## 2023-01-31 ENCOUNTER — TRANSCRIPTION ENCOUNTER (OUTPATIENT)
Age: 48
End: 2023-01-31

## 2023-01-31 ENCOUNTER — NON-APPOINTMENT (OUTPATIENT)
Age: 48
End: 2023-01-31

## 2023-02-01 LAB
ALBUMIN SERPL ELPH-MCNC: 4.4 G/DL
ALP BLD-CCNC: 84 U/L
ALT SERPL-CCNC: 14 U/L
ANION GAP SERPL CALC-SCNC: 10 MMOL/L
AST SERPL-CCNC: 18 U/L
BILIRUB SERPL-MCNC: <0.2 MG/DL
BUN SERPL-MCNC: 14 MG/DL
CALCIUM SERPL-MCNC: 9.4 MG/DL
CHLORIDE SERPL-SCNC: 105 MMOL/L
CHOLEST SERPL-MCNC: 267 MG/DL
CO2 SERPL-SCNC: 26 MMOL/L
CREAT SERPL-MCNC: 0.91 MG/DL
EGFR: 77 ML/MIN/1.73M2
ESTIMATED AVERAGE GLUCOSE: 108 MG/DL
GLUCOSE SERPL-MCNC: 88 MG/DL
HBA1C MFR BLD HPLC: 5.4 %
HDLC SERPL-MCNC: 42 MG/DL
LDLC SERPL CALC-MCNC: 200 MG/DL
NONHDLC SERPL-MCNC: 225 MG/DL
POTASSIUM SERPL-SCNC: 4.6 MMOL/L
PROT SERPL-MCNC: 7.7 G/DL
SODIUM SERPL-SCNC: 140 MMOL/L
T4 SERPL-MCNC: 6.5 UG/DL
TRIGL SERPL-MCNC: 125 MG/DL
TSH SERPL-ACNC: 1.1 UIU/ML

## 2023-02-09 ENCOUNTER — APPOINTMENT (OUTPATIENT)
Dept: CT IMAGING | Facility: CLINIC | Age: 48
End: 2023-02-09
Payer: COMMERCIAL

## 2023-02-09 ENCOUNTER — OUTPATIENT (OUTPATIENT)
Dept: OUTPATIENT SERVICES | Facility: HOSPITAL | Age: 48
LOS: 1 days | End: 2023-02-09

## 2023-02-09 ENCOUNTER — APPOINTMENT (OUTPATIENT)
Dept: HEART AND VASCULAR | Facility: CLINIC | Age: 48
End: 2023-02-09
Payer: COMMERCIAL

## 2023-02-09 ENCOUNTER — NON-APPOINTMENT (OUTPATIENT)
Age: 48
End: 2023-02-09

## 2023-02-09 VITALS
HEART RATE: 64 BPM | OXYGEN SATURATION: 99 % | HEIGHT: 65 IN | BODY MASS INDEX: 29.99 KG/M2 | WEIGHT: 180 LBS | DIASTOLIC BLOOD PRESSURE: 81 MMHG | SYSTOLIC BLOOD PRESSURE: 118 MMHG

## 2023-02-09 PROCEDURE — 75574 CT ANGIO HRT W/3D IMAGE: CPT | Mod: 26

## 2023-02-09 PROCEDURE — 93306 TTE W/DOPPLER COMPLETE: CPT

## 2023-02-15 ENCOUNTER — NON-APPOINTMENT (OUTPATIENT)
Age: 48
End: 2023-02-15

## 2023-05-12 ENCOUNTER — APPOINTMENT (OUTPATIENT)
Dept: HEART AND VASCULAR | Facility: CLINIC | Age: 48
End: 2023-05-12
Payer: COMMERCIAL

## 2023-05-12 VITALS
WEIGHT: 180 LBS | BODY MASS INDEX: 41.66 KG/M2 | HEIGHT: 55 IN | TEMPERATURE: 97.2 F | OXYGEN SATURATION: 97 % | HEART RATE: 77 BPM | DIASTOLIC BLOOD PRESSURE: 76 MMHG | SYSTOLIC BLOOD PRESSURE: 110 MMHG

## 2023-05-12 DIAGNOSIS — E78.5 HYPERLIPIDEMIA, UNSPECIFIED: ICD-10-CM

## 2023-05-12 DIAGNOSIS — R06.00 DYSPNEA, UNSPECIFIED: ICD-10-CM

## 2023-05-12 PROCEDURE — 93000 ELECTROCARDIOGRAM COMPLETE: CPT

## 2023-05-12 PROCEDURE — 36415 COLL VENOUS BLD VENIPUNCTURE: CPT

## 2023-05-12 PROCEDURE — 99214 OFFICE O/P EST MOD 30 MIN: CPT | Mod: 25

## 2023-05-12 RX ORDER — METOPROLOL SUCCINATE 25 MG/1
25 TABLET, EXTENDED RELEASE ORAL
Qty: 2 | Refills: 0 | Status: DISCONTINUED | COMMUNITY
Start: 2023-01-27 | End: 2023-05-12

## 2023-05-12 RX ORDER — OMEPRAZOLE MAGNESIUM 40 MG/1
40 CAPSULE, DELAYED RELEASE ORAL
Refills: 0 | Status: ACTIVE | COMMUNITY

## 2023-05-12 NOTE — CARDIOLOGY SUMMARY
[de-identified] : \par 1/27/23 EKG: NSR @ 75 bpm\par 5/12/23 EKG: NSR [de-identified] : \par 2/9/23 TTE: LVEF 50-55%, normal RV, no significant valvular disease, normal LV/RV size, no pericardial effusion [de-identified] : \par 2/9/23 CCTA: 0 CAC, normal cors, cardiomegaly

## 2023-05-12 NOTE — DISCUSSION/SUMMARY
[FreeTextEntry1] : Leonor Bermudez is a 46 yo W with hx of HLD and vertigo who presented with CP and is here for follow-up.\par \par HLD/ASCVD risk reduction:\par -  prior to statin initiation\par - CTA with clean coronary arteries and no evidence of disease\par - No risk enhancing features -- no smoking, no family history, no diabetes, BP well controlled\par plan:\par   -f/u lipid panel obtained today\par   -continue with Crestor 20mg qd\par - given severely elevated LDL, reasonable to check Lp(a) as would impact family screening - pt agreeable \par \par Borderline EF\par - asymptomatic, BP wnl\par - recheck limited echo for EF 6-12 months; she prefers sooner check -- ordered for 6 months from initial; if remains borderline or lower would check cMRI \par  \par Chest pain\par -has resolved since initial episode, work up thus far negative\par -if recurs will consider cMRI\par \par Follow-up 6 months for HL or sooner as needed based on lab results [EKG obtained to assist in diagnosis and management of assessed problem(s)] : EKG obtained to assist in diagnosis and management of assessed problem(s)

## 2023-05-12 NOTE — HISTORY OF PRESENT ILLNESS
[FreeTextEntry1] : Leonor Bermudez is a 46 yo W with hx of HLD and vertigo who presented with CP and is here for follow-up. \par \par Initially seen Jan 2023. At that time, ordered for CCTA and TTE for CP. Lipids checked - , started on Crestor. \par \par Since then:\par -CCTA showed 0 cac, normal cors, cardiomegaly\par -TTE showed EF 50-55%\par -if persistent CP, cMRI recommended however pt reported symptoms resolved\par \par Today:\par -Patient reports that she has looked back at her blood work and has had high cholesterol since her 20s.\par -Has not had chest pain since the first episode.\par -Still pre-menopausal\par -Has been compliant with Crestor, no issues on medication\par \par Prev hx:\par She presented to [facility] ([facility] on 01/22/23 with chest pain. Around 3 am she woke up with a sharp pain under her left breast, lasting seconds at a time. Once the sharp pain subsided she developed a dull pain. Several minutes after the pain went away she experienced left forearm numbness and some tightness in her neck.\par She experienced a similar type of chest pain on 1/17/23. She woke up at 4 am with a sharp pain under her left breast. The pain lasted several seconds then resolved spontaneously. No radiation of the pain. \par On 1/19, she had dizziness upon waking up. She has a hx of vertigo but reports this didn't feel the same. The dizziness persisted for about an hour before resolving spontaneously.\par She had COVID twice (03/2020, 03/2021) and since then she reports central chest tightness and difficulty breathing only when she is in the heat. She saw Dr. Barajas and had a full pulmonary workup which was normal. She does take an albuterol inhaler PRN but she hasn't used it in over a year.\par She was on Crestor 10 mg for one year. She reports occasional joint pain of the medication. 07/2022 she had surgery on her left ankle and she stopped the statin because she feared it would delay her recovery. She is open to re-starting the medication now that her ankle is healed. \par Activity: She is getting PT for her ankle twice/week. She also walks on the treadmill (20 minutes) and rides the stationary bike (20 minutes) 2-3 days per week.\par She monitors her BP at home on occasion with readings normally 120s/70s.\par \par \par ============================================\par Lifestyle History:\par Mediterranean Diet Score (9 question survey) was 4.\par (8-9: optimal, 6-7: near-optimal, 4-5: suboptimal, 0-3: markedly suboptimal)\par Exercise: Patient reports exercising at a moderate level for <30 minutes per week. \par Never smoker\par No stress \par She snores but reports no daytime sleepiness\par \par OB/GYN Hx:\par No PCOS	\par Pregnant 3 times, no complications \par Did you have any complications during pregnancy?\par No menopause\par No HRT

## 2023-05-12 NOTE — REASON FOR VISIT
[Hyperlipidemia] : hyperlipidemia [Hypertension] : hypertension [FreeTextEntry3] : Dr. Owen ([place])

## 2023-05-17 LAB
APO LP(A) SERPL-MCNC: 81.7 NMOL/L
CHOLEST SERPL-MCNC: 151 MG/DL
HDLC SERPL-MCNC: 55 MG/DL
LDLC SERPL CALC-MCNC: 86 MG/DL
NONHDLC SERPL-MCNC: 96 MG/DL
TRIGL SERPL-MCNC: 50 MG/DL

## 2023-08-25 RX ORDER — ROSUVASTATIN CALCIUM 20 MG/1
20 TABLET, FILM COATED ORAL
Qty: 90 | Refills: 1 | Status: ACTIVE | COMMUNITY
Start: 2023-02-01 | End: 1900-01-01

## 2024-02-21 ENCOUNTER — APPOINTMENT (OUTPATIENT)
Dept: RADIOLOGY | Facility: CLINIC | Age: 49
End: 2024-02-21
Payer: COMMERCIAL

## 2024-02-21 ENCOUNTER — RESULT REVIEW (OUTPATIENT)
Age: 49
End: 2024-02-21

## 2024-02-21 ENCOUNTER — OUTPATIENT (OUTPATIENT)
Dept: OUTPATIENT SERVICES | Facility: HOSPITAL | Age: 49
LOS: 1 days | End: 2024-02-21

## 2024-02-21 PROCEDURE — 73610 X-RAY EXAM OF ANKLE: CPT | Mod: 26,LT

## (undated) DEVICE — PREP CHLORAPREP HI-LITE ORANGE 26ML

## (undated) DEVICE — DRSG COBAN 6"

## (undated) DEVICE — DRSG STERISTRIPS 0.5 X 4"

## (undated) DEVICE — SHAVER BLADE GATOR 4.2MM X 19CM

## (undated) DEVICE — SUT VICRYL 0 36" CT-1 UNDYED

## (undated) DEVICE — DRILL BIT ARTHREX 2.5MM

## (undated) DEVICE — PACK ORTHO FOOT ANKLE

## (undated) DEVICE — DRSG WEBRIL 4"

## (undated) DEVICE — DRSG STOCKINETTE IMPERVIOUS XL

## (undated) DEVICE — DRAPE C ARM UNIVERSAL

## (undated) DEVICE — DRILL BIT ARTHREX 2MM

## (undated) DEVICE — SUT VICRYL 0 27" SH UNDYED

## (undated) DEVICE — SUT VICRYL 2-0 27" CT-1 UNDYED

## (undated) DEVICE — DRSG ACE BANDAGE 4"

## (undated) DEVICE — SUT VICRYL 3-0 27" SH UNDYED

## (undated) DEVICE — SUT MONOCRYL 3-0 18" PS-1

## (undated) DEVICE — CUFF TOURNIQUET 42" SINGLE PORT W PLC